# Patient Record
Sex: FEMALE | Race: WHITE | NOT HISPANIC OR LATINO | Employment: OTHER | RURAL
[De-identification: names, ages, dates, MRNs, and addresses within clinical notes are randomized per-mention and may not be internally consistent; named-entity substitution may affect disease eponyms.]

---

## 2017-03-21 ENCOUNTER — OFFICE VISIT (OUTPATIENT)
Dept: CARDIOLOGY | Facility: CLINIC | Age: 57
End: 2017-03-21

## 2017-03-21 VITALS
SYSTOLIC BLOOD PRESSURE: 163 MMHG | WEIGHT: 188.6 LBS | BODY MASS INDEX: 34.71 KG/M2 | HEIGHT: 62 IN | DIASTOLIC BLOOD PRESSURE: 86 MMHG | HEART RATE: 72 BPM

## 2017-03-21 DIAGNOSIS — E78.5 DYSLIPIDEMIA: ICD-10-CM

## 2017-03-21 DIAGNOSIS — I25.10 CORONARY ARTERY DISEASE INVOLVING NATIVE CORONARY ARTERY OF NATIVE HEART WITHOUT ANGINA PECTORIS: Primary | ICD-10-CM

## 2017-03-21 DIAGNOSIS — I10 ESSENTIAL HYPERTENSION: ICD-10-CM

## 2017-03-21 DIAGNOSIS — I25.10 CORONARY ARTERY DISEASE INVOLVING NATIVE CORONARY ARTERY OF NATIVE HEART WITHOUT ANGINA PECTORIS: ICD-10-CM

## 2017-03-21 PROCEDURE — 99213 OFFICE O/P EST LOW 20 MIN: CPT | Performed by: INTERNAL MEDICINE

## 2017-03-21 RX ORDER — LISINOPRIL 10 MG/1
10 TABLET ORAL DAILY
Qty: 30 TABLET | Refills: 6 | Status: SHIPPED | OUTPATIENT
Start: 2017-03-21 | End: 2018-09-25

## 2017-03-21 RX ORDER — ASPIRIN 325 MG
325 TABLET ORAL DAILY
COMMUNITY

## 2017-03-21 RX ORDER — NITROGLYCERIN 0.4 MG/1
0.4 TABLET SUBLINGUAL
COMMUNITY
End: 2017-10-17 | Stop reason: SDUPTHER

## 2017-03-21 RX ORDER — OMEPRAZOLE 20 MG/1
40 CAPSULE, DELAYED RELEASE ORAL DAILY
COMMUNITY
End: 2022-06-15 | Stop reason: HOSPADM

## 2017-03-21 RX ORDER — MELATONIN
1000 DAILY
COMMUNITY

## 2017-03-21 RX ORDER — METOPROLOL TARTRATE 50 MG/1
50 TABLET, FILM COATED ORAL EVERY 12 HOURS SCHEDULED
COMMUNITY

## 2017-03-21 NOTE — PROGRESS NOTES
Encounter Date:03/21/2017      Patient ID: Andreea Lopes is a 56 y.o. female.    Chief Complaint: Coronary Artery Disease; Hypertension; and Hyperlipidemia    PROBLEM LIST:  1.  Chronic recurrent chest pains:   a. Multiple previous negative myocardial perfusion study.   b. Positive myocardial perfusion study, 06/12/2015 revealing anterior and anterolateral  ischemia with an LVEF (74%).   c. Cardiac catheterization, 01/04/2016, Barber Mcdonnell MD, revealing nonobstructive coronary artery disease with a 30% to 40% proximal LAD, 30% to 40% mid-LAD, and a 50% distal LAD stenosis of the small caliber segment  of the vessel.  Circumflex had 10% to 20% proximal plaque.  Normal LVEF (65%).   d. Medical management and risk factor modification.   2.  Hypertension.   3. Dyslipidemia.   4. Diabetes mellitus type 2.   5. Obesity with a BMI of 33.   6. Anxiety.   7. Hypothyroidism, on chronic replacement therapy.   8. GERD.   9. Surgical history:   a. Hysterectomy.       History of Present Illness  Patient presents today for follow-up.  Since last visit she has been feeling fine from a cardiac standpoint. Discontinued Imdur after experiencing headaches.  States that her statin therapy was discontinued after liver enzyme elevations. Monitors blood pressure at home notes it usually runs in the 150's/80's.  Denies chest pain, shortness of breath, leg swelling, dizzy spells, palpitations, or syncope. Remains busy and active.    No Known Allergies      Current Outpatient Prescriptions:   •  aspirin 325 MG tablet, Take 325 mg by mouth Daily., Disp: , Rfl:   •  cholecalciferol (VITAMIN D3) 1000 UNITS tablet, Take 1,000 Units by mouth Daily., Disp: , Rfl:   •  levothyroxine (SYNTHROID, LEVOTHROID) 50 MCG tablet, Take 50 mcg by mouth Daily., Disp: , Rfl:   •  metFORMIN (GLUCOPHAGE) 500 MG tablet, Take 500 mg by mouth 2 (Two) Times a Day With Meals., Disp: , Rfl:   •  metoprolol tartrate (LOPRESSOR) 50 MG tablet, Take 50 mg by mouth  "2 (Two) Times a Day. Patient doesn't take BID everyday, Disp: , Rfl:   •  nitroglycerin (NITROSTAT) 0.4 MG SL tablet, Place 0.4 mg under the tongue Every 5 (Five) Minutes As Needed for Chest Pain. Take no more than 3 doses in 15 minutes., Disp: , Rfl:   •  omeprazole (priLOSEC) 20 MG capsule, Take 20 mg by mouth Daily., Disp: , Rfl:   •  lisinopril (PRINIVIL,ZESTRIL) 10 MG tablet, Take 1 tablet by mouth Daily., Disp: 30 tablet, Rfl: 6    The following portions of the patient's history were reviewed and updated as appropriate: allergies, current medications, past medical history, past social history and problem list.    Review of Systems   Constitution: Negative for chills, fever, weight gain and weight loss.   Cardiovascular: Negative for chest pain, claudication, dyspnea on exertion, leg swelling, orthopnea, palpitations, paroxysmal nocturnal dyspnea and syncope.        No dizziness   Gastrointestinal: Negative for abdominal pain, constipation, diarrhea, nausea and vomiting.   Genitourinary:        No urinary symptoms   Neurological:        No symptoms of stroke.   All other systems reviewed and are negative.    Objective:     Blood pressure 163/86, pulse 72, height 62\" (157.5 cm), weight 188 lb 9.6 oz (85.5 kg).  Repeat blood pressure measurement by, Barber Mcdonnell MD, at 170/80      Physical Exam   Constitutional: She appears well-developed and well-nourished.   HENT:   HEENT exam unremarkable.   Neck: Neck supple. No JVD present.   No carotid bruits.   Cardiovascular: Normal rate, regular rhythm and normal heart sounds.    No murmur heard.  2 plus symmetric pulses.   Pulmonary/Chest: Breath sounds normal. She exhibits no tenderness.   Abdominal:   Abdomen benign.   Musculoskeletal: She exhibits no edema.   Neurological:   Neurological exam unremarkable.   Vitals reviewed.    Procedures       Assessment:      Diagnosis Plan   1. Coronary artery disease involving native coronary artery of native heart without angina " pectoris  Comprehensive Metabolic Panel    Lipid Panel   2. Dyslipidemia  Comprehensive Metabolic Panel    Lipid Panel   3. Essential hypertension  Comprehensive Metabolic Panel    Lipid Panel     Plan:   Check FLP and CMP.  After review of results will consider restarting statin therapy.  Start lisinopril 10 mg once daily for better control of blood pressure.  Continue all other current medications.   FU in 6 MO, sooner as needed.  Thank you for allowing us to participate in the care of your patient.     Barber Mcdonnell MD    Please note that portions of this note may have been completed with a voice recognition program. Efforts were made to edit the dictations, but occasionally words are mistranscribed.

## 2017-03-29 ENCOUNTER — TELEPHONE (OUTPATIENT)
Dept: CARDIOLOGY | Facility: CLINIC | Age: 57
End: 2017-03-29

## 2017-03-29 NOTE — TELEPHONE ENCOUNTER
----- Message -----     From: Barber Mcdonnell MD     Sent: 3/23/2017   7:05 PM       To: Tiffany Lee    Please notify her that her liver function tests are normal.  Blood sugar is elevated at 192 and needs further management with her primary care physician.  Her cholesterol level is quite elevated and she will benefit from restarting cholesterol medications.  Prescribed pravastatin 40 mg daily and order FLP and LFTs to be checked in 2 months.

## 2017-03-29 NOTE — TELEPHONE ENCOUNTER
"Discussed findings with patient.    She is not able to tolerate Pravastatin as it makes her feel \"uncomfortable\".  She was previously on Atorvastatin 40mg daily and tolerated it well, but it did cause a rise in her liver enzymes and her PCP dc'd it.  Please advise.      "

## 2017-03-30 NOTE — TELEPHONE ENCOUNTER
Start her on Lipitor 20 mg a day, check FLP and LFTs in 2 months, she should follow-up with PCP for further management.

## 2017-03-31 DIAGNOSIS — Z79.899 HIGH RISK MEDICATION USE: ICD-10-CM

## 2017-03-31 DIAGNOSIS — E78.00 HYPERCHOLESTEREMIA: Primary | ICD-10-CM

## 2017-03-31 RX ORDER — ATORVASTATIN CALCIUM 20 MG/1
20 TABLET, FILM COATED ORAL DAILY
Qty: 90 TABLET | Refills: 3 | Status: SHIPPED | OUTPATIENT
Start: 2017-03-31 | End: 2017-10-17

## 2017-03-31 NOTE — TELEPHONE ENCOUNTER
Informed patient of medication change, and that I will mail lab orders to her.  Patient verbalized understanding.

## 2017-05-22 DIAGNOSIS — I10 ESSENTIAL HYPERTENSION: ICD-10-CM

## 2017-05-22 DIAGNOSIS — E78.5 DYSLIPIDEMIA: ICD-10-CM

## 2017-05-22 DIAGNOSIS — Z79.899 HIGH RISK MEDICATION USE: ICD-10-CM

## 2017-05-22 DIAGNOSIS — I25.10 CORONARY ARTERY DISEASE INVOLVING NATIVE CORONARY ARTERY OF NATIVE HEART WITHOUT ANGINA PECTORIS: ICD-10-CM

## 2017-05-22 DIAGNOSIS — E78.00 HYPERCHOLESTEREMIA: ICD-10-CM

## 2017-10-17 ENCOUNTER — OFFICE VISIT (OUTPATIENT)
Dept: CARDIOLOGY | Facility: CLINIC | Age: 57
End: 2017-10-17

## 2017-10-17 VITALS
HEART RATE: 72 BPM | SYSTOLIC BLOOD PRESSURE: 129 MMHG | HEIGHT: 62 IN | WEIGHT: 174 LBS | DIASTOLIC BLOOD PRESSURE: 75 MMHG | BODY MASS INDEX: 32.02 KG/M2

## 2017-10-17 DIAGNOSIS — I10 ESSENTIAL HYPERTENSION: ICD-10-CM

## 2017-10-17 DIAGNOSIS — R07.2 PRECORDIAL PAIN: Primary | ICD-10-CM

## 2017-10-17 DIAGNOSIS — E78.5 DYSLIPIDEMIA: ICD-10-CM

## 2017-10-17 PROCEDURE — 99213 OFFICE O/P EST LOW 20 MIN: CPT | Performed by: INTERNAL MEDICINE

## 2017-10-17 RX ORDER — ALPRAZOLAM 0.5 MG/1
0.5 TABLET ORAL 2 TIMES DAILY PRN
COMMUNITY

## 2017-10-17 RX ORDER — IBUPROFEN 600 MG/1
600 TABLET ORAL EVERY 6 HOURS PRN
COMMUNITY

## 2017-10-17 RX ORDER — NITROGLYCERIN 0.4 MG/1
0.4 TABLET SUBLINGUAL
Qty: 100 TABLET | Refills: 3 | Status: SHIPPED | OUTPATIENT
Start: 2017-10-17 | End: 2021-10-26 | Stop reason: SDUPTHER

## 2017-10-17 NOTE — PROGRESS NOTES
Encounter Date:10/17/2017      Patient ID: Andreea Lopes is a 57 y.o. female.    Chief Complaint: Coronary Artery Disease    PROBLEM LIST:  1. Chronic recurrent chest pains:  a. Multiple previous negative MPS.  b. Positive MPS, 6/12/2015 revealed anterior and anterolateral ischemia with LVEF 74%.  c. Cardiac catheterization, 1/4/2016, Barber Mcdonnell M.D., revealing nonobstructive CAD with 30-40% proximal LAD, 30-40% mid LAD, and 50% distal LAD stenosis of small caliber segment of vessel.  Circumflex had 10-20% proximal plaque.  Normal LVEF 65%.  d. Medical management and risk factor modification.  2. Hypertension  3. Dyslipidemia  4. DM 2  5. Obesity with BMI 33  6. Anxiety  7. Hypothyroidism, on chronic respiratory therapy.  8. GERD  9. Surgical history:  a. Hysterectomy.    History of Present Illness  Patient presents today for follow-up with a history of recurrent chest pains and cardiac risk factors. Lately she continues to experience episodes of chest pain. Symptoms are random and not necessarily associated with exertion. States that she takes about 1-3 tablets of Nitro per week.  Discontinued Imdur because it was causing headaches which are not caused when she takes nitroglycerin.  She also discontinued statin therapy on her own explaining that she felt worse. Denies chest pain, shortness of breath, leg swelling, palpitations, and syncope. 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  Maintains a healthy diet and understands she needs to since she discontinued her statin therapy.  After her lipid panel in March we had started her on Lipitor 20 mg a day which she discontinued due to severe myalgias, states that she had similar side effects to simvastatin, afterwards she working her diet and repeat lipid panel was excellent.    No Known Allergies      Current Outpatient Prescriptions:   •  ALPRAZolam (XANAX) 0.5 MG tablet, Take 0.5 mg by mouth 2 (Two)  Times a Day As Needed for Anxiety., Disp: , Rfl:   •  aspirin 325 MG tablet, Take 325 mg by mouth Daily., Disp: , Rfl:   •  cholecalciferol (VITAMIN D3) 1000 UNITS tablet, Take 1,000 Units by mouth Daily., Disp: , Rfl:   •  Dapagliflozin Propanediol (FARXIGA) 10 MG tablet, Take  by mouth Daily., Disp: , Rfl:   •  ibuprofen (ADVIL,MOTRIN) 600 MG tablet, Take 600 mg by mouth Every 6 (Six) Hours As Needed for Mild Pain ., Disp: , Rfl:   •  levothyroxine (SYNTHROID, LEVOTHROID) 50 MCG tablet, Take 50 mcg by mouth Daily., Disp: , Rfl:   •  lisinopril (PRINIVIL,ZESTRIL) 10 MG tablet, Take 1 tablet by mouth Daily., Disp: 30 tablet, Rfl: 6  •  metFORMIN (GLUCOPHAGE) 500 MG tablet, Take 500 mg by mouth 2 (Two) Times a Day With Meals., Disp: , Rfl:   •  metoprolol tartrate (LOPRESSOR) 50 MG tablet, Take 50 mg by mouth Daily. Patient doesn't take BID everyday , Disp: , Rfl:   •  nitroglycerin (NITROSTAT) 0.4 MG SL tablet, Place 0.4 mg under the tongue Every 5 (Five) Minutes As Needed for Chest Pain. Take no more than 3 doses in 15 minutes., Disp: , Rfl:   •  omeprazole (priLOSEC) 20 MG capsule, Take 40 mg by mouth Daily., Disp: , Rfl:     The following portions of the patient's history were reviewed and updated as appropriate: allergies, current medications, past family history, past medical history, past social history, past surgical history and problem list.    Review of Systems   Constitution: Positive for weakness. Negative for chills, fever, weight gain and weight loss.   Cardiovascular: Positive for chest pain. Negative for claudication, dyspnea on exertion, leg swelling, orthopnea, palpitations, paroxysmal nocturnal dyspnea and syncope.        No dizziness   Gastrointestinal: Negative for abdominal pain, constipation, diarrhea, nausea and vomiting.   Genitourinary:        No urinary symptoms   Neurological:        No symptoms of stroke.   All other systems reviewed and are negative.      Objective:     Blood pressure  "129/75, pulse 72, height 62\" (157.5 cm), weight 174 lb (78.9 kg).      Physical Exam  Constitutional: She appears well-developed and well-nourished.   HENT:   HEENT exam unremarkable.   Neck: Neck supple. No JVD present.   No carotid bruits.   Cardiovascular: Normal rate, regular rhythm and normal heart sounds.    No murmur heard.  2 plus symmetric pulses.   Pulmonary/Chest: Breath sounds normal. Does not exhibit tenderness.   Abdominal:   Abdomen benign.   Musculoskeletal: Does not exhibit edema.   Neurological:   Neurological exam unremarkable.   Vitals reviewed.    Lab Review:   Procedures       Assessment:      Diagnosis Plan   1. Precordial pain  Recurrent, stable and spastic angina.  Relieved with nitroglycerin.     2. Essential hypertension  Controlled on beta blocker and ACE inhibitor therapy.     3. Dyslipidemia  Patient refuses to take statin therapy as it does not make her feel well.  Reviewed her FLP at today's visit. Decided to continue to control with diet and exercise.       Plan:   Advised to maintain a healthy diet and engage in exercise for optimal management of dyslipidemia since she refuses to undergo statin therapy.  Follow with PCP for risk factor management.  Stable cardiac status.  Continue current medications.   FU in 12 MO, sooner as needed.  Thank you for allowing us to participate in the care of your patient.     Barber Mcdonnell MD, FACC, [facility]            Please note that portions of this note may have been completed with a voice recognition program. Efforts were made to edit the dictations, but occasionally words are mistranscribed.    "

## 2018-03-20 ENCOUNTER — OFFICE VISIT (OUTPATIENT)
Dept: CARDIOLOGY | Facility: CLINIC | Age: 58
End: 2018-03-20

## 2018-03-20 VITALS
WEIGHT: 181 LBS | HEIGHT: 62 IN | HEART RATE: 66 BPM | DIASTOLIC BLOOD PRESSURE: 80 MMHG | BODY MASS INDEX: 33.31 KG/M2 | SYSTOLIC BLOOD PRESSURE: 159 MMHG

## 2018-03-20 DIAGNOSIS — E78.5 DYSLIPIDEMIA: ICD-10-CM

## 2018-03-20 DIAGNOSIS — I10 ESSENTIAL HYPERTENSION: ICD-10-CM

## 2018-03-20 DIAGNOSIS — R07.2 PRECORDIAL PAIN: Primary | ICD-10-CM

## 2018-03-20 PROCEDURE — 99213 OFFICE O/P EST LOW 20 MIN: CPT | Performed by: INTERNAL MEDICINE

## 2018-03-20 RX ORDER — AMLODIPINE BESYLATE 5 MG/1
5 TABLET ORAL DAILY
Qty: 30 TABLET | Refills: 6 | Status: SHIPPED | OUTPATIENT
Start: 2018-03-20 | End: 2018-09-25

## 2018-03-20 RX ORDER — ESTRADIOL 1 MG/1
1 TABLET ORAL DAILY
COMMUNITY
End: 2018-09-25

## 2018-03-20 NOTE — PROGRESS NOTES
Encounter Date:03/20/2018      Patient ID: Andreea Lopes is a 57 y.o. female.    Chief Complaint: Coronary Artery Disease      PROBLEM LIST:  1. Chronic recurrent chest pains:  a. Multiple previous negative MPS.  b. Positive MPS, 6/12/2015 revealed anterior and anterolateral ischemia with LVEF 74%.  c. Cardiac catheterization, 1/4/2016, Barber Mcdonnell M.D., revealing nonobstructive CAD with 30-40% proximal LAD, 30-40% mid LAD, and 50% distal LAD stenosis of small caliber segment of vessel.  Circumflex had 10-20% proximal plaque.  Normal LVEF 65%.  d. Medical management and risk factor modification.  2. Hypertension  3. Dyslipidemia  4. DM 2  5. Obesity with BMI 33  6. Anxiety  7. Hypothyroidism, on chronic respiratory therapy.  8. GERD  9. Surgical history:  a. Hysterectomy.    History of Present Illness  Patient presents today for follow-up with a history of chest pain and cardiac risk factors. Since last visit has been doing well form a cardiac standpoint. Had a recent admission to the hospital after an episode of chest pain. Was diagnosed with pancreatitis and was given fluids and a nitro patch while admitted. States that although symptoms have improved, mild pain is still present. Discontinued her cholesterol medication due to side affects.  This that she was noted to have sludge on her gallbladder ultrasound.  Has failed to monitor her diet. Denies shortness of breath, leg swelling, palpitations, and syncope. Remains busy and active without significant limitations.     No Known Allergies      Current Outpatient Prescriptions:   •  ALPRAZolam (XANAX) 0.5 MG tablet, Take 0.5 mg by mouth 2 (Two) Times a Day As Needed for Anxiety., Disp: , Rfl:   •  aspirin 325 MG tablet, Take 325 mg by mouth Daily., Disp: , Rfl:   •  cholecalciferol (VITAMIN D3) 1000 UNITS tablet, Take 1,000 Units by mouth Daily., Disp: , Rfl:   •  estradiol (ESTRACE) 1 MG tablet, Take 1 mg by mouth Daily., Disp: , Rfl:   •  ibuprofen  "(ADVIL,MOTRIN) 600 MG tablet, Take 600 mg by mouth Every 6 (Six) Hours As Needed for Mild Pain ., Disp: , Rfl:   •  levothyroxine (SYNTHROID, LEVOTHROID) 50 MCG tablet, Take 50 mcg by mouth Daily., Disp: , Rfl:   •  lisinopril (PRINIVIL,ZESTRIL) 10 MG tablet, Take 1 tablet by mouth Daily., Disp: 30 tablet, Rfl: 6  •  metFORMIN (GLUCOPHAGE) 500 MG tablet, Take 500 mg by mouth 2 (Two) Times a Day With Meals., Disp: , Rfl:   •  metoprolol tartrate (LOPRESSOR) 50 MG tablet, Take 50 mg by mouth Every 12 (Twelve) Hours. Patient doesn't take BID everyday , Disp: , Rfl:   •  nitroglycerin (NITROSTAT) 0.4 MG SL tablet, Place 1 tablet under the tongue Every 5 (Five) Minutes As Needed for Chest Pain. Take no more than 3 doses in 15 minutes., Disp: 100 tablet, Rfl: 3  •  omeprazole (priLOSEC) 20 MG capsule, Take 40 mg by mouth Daily., Disp: , Rfl:   •  amLODIPine (NORVASC) 5 MG tablet, Take 1 tablet by mouth Daily., Disp: 30 tablet, Rfl: 6    The following portions of the patient's history were reviewed and updated as appropriate: allergies, current medications, past family history, past medical history, past social history, past surgical history and problem list.    ROS  Review of Systems   Constitution: Negative for chills, fever, weight gain and weight loss.   Cardiovascular: Negative for chest pain, claudication, dyspnea on exertion, leg swelling, orthopnea, palpitations, paroxysmal nocturnal dyspnea and syncope.        No dizziness   Gastrointestinal: Negative for abdominal pain, constipation, diarrhea, nausea and vomiting.   Genitourinary:        No urinary symptoms   Neurological:        No symptoms of stroke.   All other systems reviewed and are negative.    Objective:     Blood pressure 159/80, pulse 66, height 157.5 cm (62\"), weight 82.1 kg (181 lb).  Patient reports that blood pressure runs normal around 120 at home.    Physical Exam  Constitutional: She appears well-developed and well-nourished.   HENT:   HEENT exam " unremarkable.   Neck: Neck supple. No JVD present.   No carotid bruits.   Cardiovascular: Normal rate, regular rhythm and normal heart sounds.    No murmur heard.  2 plus symmetric pulses.   Pulmonary/Chest: Breath sounds normal. Does not exhibit tenderness.   Abdominal:   Abdomen benign.   Musculoskeletal: Does not exhibit edema.   Neurological:   Neurological exam unremarkable.   Vitals reviewed.    Lab Review:   Procedures       Assessment:      Diagnosis Plan   1. Precordial pain And nonocclusive CAD  Add amlodipine, previous Imdur was not tolerated due to headache.     2. Essential hypertension  Add Amlodipine 5 mg to current medication therapy   3. Dyslipidemia. LDL goal < 70. Does not take statin therapy due to side affects     Plan:   Recommended optimal lipid management with good diet, close follow-up with PCP and trial of other medications as this is paramount to preventing cardiac problems in future.  Add amlodipine 5 mg, continue rest of the current medications.  Suspect a lot of her symptoms may be due to gallbladder disease, I have recommended further evaluation with surgery as recommended by her PCP.  Follow-up and 6 MO, sooner as needed.  Thank you for allowing us to participate in the care of your patient.     Scribed for Barber Mcdonnell MD by Rickie Singh. 3/20/2018  11:26 AM    I, Barber Mcdonnell MD, personally performed the services described in this documentation as scribed by the above named individual in my presence, and it is both accurate and complete.  3/20/2018  11:35 AM        Please note that portions of this note may have been completed with a voice recognition program. Efforts were made to edit the dictations, but occasionally words are mistranscribed.

## 2018-09-25 ENCOUNTER — OFFICE VISIT (OUTPATIENT)
Dept: CARDIOLOGY | Facility: CLINIC | Age: 58
End: 2018-09-25

## 2018-09-25 VITALS
HEART RATE: 70 BPM | SYSTOLIC BLOOD PRESSURE: 140 MMHG | HEIGHT: 62 IN | DIASTOLIC BLOOD PRESSURE: 80 MMHG | BODY MASS INDEX: 32.76 KG/M2 | WEIGHT: 178 LBS | OXYGEN SATURATION: 99 %

## 2018-09-25 DIAGNOSIS — I10 ESSENTIAL HYPERTENSION: ICD-10-CM

## 2018-09-25 DIAGNOSIS — I25.10 CORONARY ARTERY DISEASE INVOLVING NATIVE CORONARY ARTERY OF NATIVE HEART WITHOUT ANGINA PECTORIS: Primary | ICD-10-CM

## 2018-09-25 DIAGNOSIS — I20.8 OTHER FORMS OF ANGINA PECTORIS (HCC): ICD-10-CM

## 2018-09-25 DIAGNOSIS — Z78.9 STATIN INTOLERANCE: ICD-10-CM

## 2018-09-25 DIAGNOSIS — E78.5 DYSLIPIDEMIA: ICD-10-CM

## 2018-09-25 PROCEDURE — 99214 OFFICE O/P EST MOD 30 MIN: CPT | Performed by: INTERNAL MEDICINE

## 2018-09-25 RX ORDER — LISINOPRIL 10 MG/1
10 TABLET ORAL DAILY
Qty: 30 TABLET | Refills: 11 | Status: SHIPPED | OUTPATIENT
Start: 2018-09-25 | End: 2020-01-21

## 2018-09-25 NOTE — PROGRESS NOTES
Encounter Date:09/25/2018      Patient ID: Andreea Lopes is a 58 y.o. female.    Chief Complaint: Coronary Artery Disease      PROBLEM LIST:  1. Chronic recurrent chest pains:  a. Multiple previous negative MPS.  b. Positive MPS, 6/12/2015 revealed anterior and anterolateral ischemia with LVEF 74%.  c. Cardiac catheterization, 1/4/2016, Barber Mcdonnell M.D., revealing nonobstructive CAD with 30-40% proximal LAD, 30-40% mid LAD, and 50% distal LAD stenosis of small caliber segment of vessel.  Circumflex had 10-20% proximal plaque.  Normal LVEF 65%.  d. Medical management and risk factor modification.  2. Hypertension  3. Dyslipidemia  4. DM 2  5. Obesity with BMI 33  6. Anxiety  7. Hypothyroidism, on chronic respiratory therapy.  8. GERD  9. Surgical history:  a. Hysterectomy.    History of Present Illness  Patient presents today for follow-up with a history of chest pain and cardiac risk factors.  She returns today for follow-up for presumed spastic angina with non-occlusive coronary artery disease, hypertension, dyslipidemia and statin intolerance.  She continues to have chest discomfort approximately once per month which requires a single nitroglycerin for relief.  She states her blood pressure has been running about 150s at home and that her lisinopril may have been reduced from 10 mg to 5 inadvertently by primary care.  Her last visit here we added amlodipine 5 mg daily which she did not tolerate.  States she took a single dose became dizzy and had elevated blood pressure.  Her cholesterol was recently checked by primary care and shows an LDL of 141 and an HDL of 57.  Her hemoglobin A1c is at target and thyroid function is within normal limits she states compliance with current medical regimen.  She states that she has tried at least 3 statins and is unable to tolerate them.  She is not interested in taking shots for her cholesterol.    No Known Allergies      Current Outpatient Prescriptions:   •   ALPRAZolam (XANAX) 0.5 MG tablet, Take 0.5 mg by mouth 2 (Two) Times a Day As Needed for Anxiety., Disp: , Rfl:   •  aspirin 325 MG tablet, Take 325 mg by mouth Daily., Disp: , Rfl:   •  cholecalciferol (VITAMIN D3) 1000 UNITS tablet, Take 1,000 Units by mouth Daily., Disp: , Rfl:   •  ibuprofen (ADVIL,MOTRIN) 600 MG tablet, Take 600 mg by mouth Every 6 (Six) Hours As Needed for Mild Pain ., Disp: , Rfl:   •  levothyroxine (SYNTHROID, LEVOTHROID) 50 MCG tablet, Take 50 mcg by mouth Daily., Disp: , Rfl:   •  lisinopril (PRINIVIL,ZESTRIL) 10 MG tablet, Take 1 tablet by mouth Daily. (Patient taking differently: Take 5 mg by mouth Daily.), Disp: 30 tablet, Rfl: 6  •  metFORMIN (GLUCOPHAGE) 500 MG tablet, Take 500 mg by mouth 2 (Two) Times a Day With Meals., Disp: , Rfl:   •  metoprolol tartrate (LOPRESSOR) 50 MG tablet, Take 50 mg by mouth Every 12 (Twelve) Hours. Patient doesn't take BID everyday , Disp: , Rfl:   •  nitroglycerin (NITROSTAT) 0.4 MG SL tablet, Place 1 tablet under the tongue Every 5 (Five) Minutes As Needed for Chest Pain. Take no more than 3 doses in 15 minutes., Disp: 100 tablet, Rfl: 3  •  omeprazole (priLOSEC) 20 MG capsule, Take 40 mg by mouth Daily., Disp: , Rfl:     The following portions of the patient's history were reviewed and updated as appropriate: allergies, current medications, past family history, past medical history, past social history, past surgical history and problem list.    ROS  Review of Systems   Constitution: Negative for chills, fever, weight gain and weight loss.   Cardiovascular: Negative for chest pain, claudication, dyspnea on exertion, leg swelling, orthopnea, palpitations, paroxysmal nocturnal dyspnea and syncope.        No dizziness   Gastrointestinal: Negative for abdominal pain, constipation, diarrhea, nausea and vomiting.   Genitourinary:        No urinary symptoms   Neurological:        No symptoms of stroke.   All other systems reviewed and are  "negative.    Objective:     Blood pressure 140/80, pulse 70, height 157.5 cm (62\"), weight 80.7 kg (178 lb), SpO2 99 %.        Physical Exam  Constitutional: She appears well-developed and well-nourished.   HENT:   HEENT exam unremarkable.   Neck: Neck supple. No JVD present.   No carotid bruits.   Cardiovascular: Normal rate, regular rhythm and normal heart sounds.    No murmur heard.  2 plus symmetric pulses.   Pulmonary/Chest: Breath sounds normal. Does not exhibit tenderness.   Abdominal:   Abdomen benign.   Musculoskeletal: Does not exhibit edema.   Neurological:   Neurological exam unremarkable.   Vitals reviewed.    Lab Review:   Procedures       Assessment:      Diagnosis Plan   1. Coronary artery disease involving native coronary artery of native heart without angina pectoris  Stable    2. Dyslipidemia  Declines PCS K-9 treatment    3. Essential hypertension  Not to goal.  I will change her lisinopril back to 10 mg daily.  I've asked her to keep a daily record for the next one to 2 weeks and call our office for possible further titration.     4. Other forms of angina pectoris (CMS/HCC)  Stable    5. Statin intolerance       Plan:     Continue current medications.   FU in 6 MO, sooner as needed.  Thank you for allowing us to participate in the care of your patient.     ISI Clarke  09/25/2018  10:00 AM    Please note that portions of this note may have been completed with a voice recognition program. Efforts were made to edit the dictations, but occasionally words are mistranscribed.      "

## 2019-01-21 ENCOUNTER — OFFICE VISIT (OUTPATIENT)
Dept: SURGERY | Facility: CLINIC | Age: 59
End: 2019-01-21

## 2019-01-21 VITALS
OXYGEN SATURATION: 99 % | WEIGHT: 178.2 LBS | DIASTOLIC BLOOD PRESSURE: 79 MMHG | HEIGHT: 62 IN | BODY MASS INDEX: 32.79 KG/M2 | HEART RATE: 74 BPM | SYSTOLIC BLOOD PRESSURE: 180 MMHG | TEMPERATURE: 97.7 F

## 2019-01-21 DIAGNOSIS — R10.11 RIGHT UPPER QUADRANT ABDOMINAL PAIN: Primary | ICD-10-CM

## 2019-01-21 PROCEDURE — 99214 OFFICE O/P EST MOD 30 MIN: CPT | Performed by: SURGERY

## 2019-01-21 NOTE — PROGRESS NOTES
Patient: Andreea Lopes    YOB: 1960    Date: 01/21/2019    Primary Care Provider: Shoshana Yee MD    Chief Complaint   Patient presents with   • Abdominal Pain       Subjective .     History of present illness:  Patient with a 1 year history now of having abdominal pain in the right side/upper quadrant.  Associated nausea.  She states that it is a pressure feeling.  Can be moderate to severe in severity.  No definite aggravating factors although working seems to make it worse.  She also has pain in the mid upper back.  No obvious dietary augmentation of the pain    The following portions of the patient's history were reviewed and updated as appropriate: allergies, current medications, past family history, past medical history, past social history, past surgical history and problem list.    Review of Systems   Constitutional: Negative for chills, fever and unexpected weight change.   HENT: Negative for hearing loss, trouble swallowing and voice change.    Eyes: Negative for visual disturbance.   Respiratory: Negative for apnea, cough, chest tightness, shortness of breath and wheezing.    Cardiovascular: Negative for chest pain, palpitations and leg swelling.   Gastrointestinal: Positive for abdominal pain and nausea. Negative for abdominal distention, anal bleeding, blood in stool, constipation, diarrhea, rectal pain and vomiting.   Endocrine: Negative for cold intolerance and heat intolerance.   Genitourinary: Negative for difficulty urinating, dysuria and flank pain.   Musculoskeletal: Negative for back pain and gait problem.   Skin: Negative for color change, rash and wound.   Neurological: Negative for dizziness, syncope, speech difficulty, weakness, light-headedness, numbness and headaches.   Hematological: Negative for adenopathy. Does not bruise/bleed easily.   Psychiatric/Behavioral: Negative for confusion. The patient is not nervous/anxious.        History:  Past Medical History:    Diagnosis Date   • Anxiety    • Chest pain     Chronic recurrent   • Diabetes mellitus (CMS/HCC)     Type 2   • Dyslipidemia     On Statin therapy   • GERD (gastroesophageal reflux disease)    • Hypertension    • Hypothyroidism     on chronic replacement therapy   • Obesity     BMI 33       Past Surgical History:   Procedure Laterality Date   • BREAST BIOPSY     • HYSTERECTOMY     • OOPHORECTOMY         Family History   Problem Relation Age of Onset   • Colon cancer Father 55   • Cancer Father    • Breast cancer Maternal Aunt 48   • Heart disease Mother    • Cancer Sister    • Heart disease Sister    • Heart disease Brother    • Cancer Paternal Uncle    • Heart disease Paternal Grandfather        Social History     Tobacco Use   • Smoking status: Never Smoker   • Smokeless tobacco: Never Used   Substance Use Topics   • Alcohol use: No   • Drug use: No       Allergies:  No Known Allergies    Medications:    Current Outpatient Medications:   •  ALPRAZolam (XANAX) 0.5 MG tablet, Take 0.5 mg by mouth 2 (Two) Times a Day As Needed for Anxiety., Disp: , Rfl:   •  aspirin 325 MG tablet, Take 325 mg by mouth Daily., Disp: , Rfl:   •  cholecalciferol (VITAMIN D3) 1000 UNITS tablet, Take 1,000 Units by mouth Daily., Disp: , Rfl:   •  ibuprofen (ADVIL,MOTRIN) 600 MG tablet, Take 600 mg by mouth Every 6 (Six) Hours As Needed for Mild Pain ., Disp: , Rfl:   •  levothyroxine (SYNTHROID, LEVOTHROID) 50 MCG tablet, Take 50 mcg by mouth Daily., Disp: , Rfl:   •  lisinopril (PRINIVIL,ZESTRIL) 10 MG tablet, Take 1 tablet by mouth Daily., Disp: 30 tablet, Rfl: 11  •  metFORMIN (GLUCOPHAGE) 500 MG tablet, Take 500 mg by mouth 2 (Two) Times a Day With Meals., Disp: , Rfl:   •  metoprolol tartrate (LOPRESSOR) 50 MG tablet, Take 50 mg by mouth Every 12 (Twelve) Hours. Patient doesn't take BID everyday , Disp: , Rfl:   •  nitroglycerin (NITROSTAT) 0.4 MG SL tablet, Place 1 tablet under the tongue Every 5 (Five) Minutes As Needed for Chest  "Pain. Take no more than 3 doses in 15 minutes., Disp: 100 tablet, Rfl: 3  •  omeprazole (priLOSEC) 20 MG capsule, Take 40 mg by mouth Daily., Disp: , Rfl:     Objective     Vital Signs:   Vitals:    01/21/19 1255   BP: 180/79   Pulse: 74   Temp: 97.7 °F (36.5 °C)   SpO2: 99%   Weight: 80.8 kg (178 lb 3.2 oz)   Height: 157.5 cm (62\")       Physical Exam:   General Appearance:    Alert, cooperative, in no acute distress   Head:    Normocephalic, without obvious abnormality, atraumatic   Eyes:            Normal.  No scleral icterus.  PERRLA    Lungs:     Clear to auscultation,respirations regular, even and                  unlabored    Heart:    Regular rhythm and normal rate, normal S1 and S2, no            murmur   Abdomen:     Normal bowel sounds, no masses, no organomegaly, soft        non-tender, non-distended, no guarding,    Extremities:   Moves all extremities well, no edema, no cyanosis, no             redness   Skin:   No bleeding, bruising or rash   Neurologic:   Normal without gross deficits.   Psychiatric: No evidence of depression or anxiety        Results Review:   I reviewed the patient's new clinical results.  Labs and ultrasound were reviewed    Review of Systems was reviewed and confirmed as accurate today.    Assessment/Plan :    1. Right upper quadrant abdominal pain        Unsure as to the etiology of her symptoms.  She had a history of this 2-3 years ago and had an upper and lower endoscopy which were more or less normal.  HIDA scan was 45%.  Sludge in the gallbladder now.  Her symptoms are not classic gallbladder in etiology.  At any rate I will go ahead and repeat her HIDA scan and follow her up after that.    Electronically signed by Ramírez Romero MD  01/21/19    Portions of this note has been scribed for Ramírez Romero MD by Tete Arana. 1/21/2019  1:58 PM        "

## 2019-02-22 ENCOUNTER — TELEPHONE (OUTPATIENT)
Dept: SURGERY | Facility: CLINIC | Age: 59
End: 2019-02-22

## 2019-02-22 NOTE — TELEPHONE ENCOUNTER
left a message on voicemail to let the patient know we are cancelling her appointment since she has not done her Hida scan.

## 2019-02-25 ENCOUNTER — TELEPHONE (OUTPATIENT)
Dept: SURGERY | Facility: CLINIC | Age: 59
End: 2019-02-25

## 2020-01-21 ENCOUNTER — OFFICE VISIT (OUTPATIENT)
Dept: CARDIOLOGY | Facility: CLINIC | Age: 60
End: 2020-01-21

## 2020-01-21 VITALS
WEIGHT: 169 LBS | HEART RATE: 66 BPM | BODY MASS INDEX: 29.95 KG/M2 | DIASTOLIC BLOOD PRESSURE: 74 MMHG | SYSTOLIC BLOOD PRESSURE: 156 MMHG | HEIGHT: 63 IN

## 2020-01-21 DIAGNOSIS — I10 ESSENTIAL HYPERTENSION: ICD-10-CM

## 2020-01-21 DIAGNOSIS — E78.5 DYSLIPIDEMIA: ICD-10-CM

## 2020-01-21 DIAGNOSIS — I25.10 CORONARY ARTERY DISEASE INVOLVING NATIVE CORONARY ARTERY OF NATIVE HEART WITHOUT ANGINA PECTORIS: Primary | ICD-10-CM

## 2020-01-21 DIAGNOSIS — R00.2 PALPITATIONS: ICD-10-CM

## 2020-01-21 PROCEDURE — 99214 OFFICE O/P EST MOD 30 MIN: CPT | Performed by: INTERNAL MEDICINE

## 2020-01-21 RX ORDER — LISINOPRIL 20 MG/1
20 TABLET ORAL DAILY
Qty: 30 TABLET | Refills: 3 | Status: SHIPPED | OUTPATIENT
Start: 2020-01-21 | End: 2021-10-26

## 2020-01-21 RX ORDER — ROSUVASTATIN CALCIUM 5 MG/1
5 TABLET, COATED ORAL 3 TIMES WEEKLY
Status: ON HOLD | COMMUNITY
End: 2022-06-15 | Stop reason: SDUPTHER

## 2020-01-21 NOTE — PROGRESS NOTES
"[facility] Cardiology    Encounter Date:2020        Patient ID: Andreea Lopes is a 59 y.o. female.  : 1960     PCP: Shoshana Yee MD     Chief Complaint: Coronary artery disease involving native coronary artery of       PROBLEM LIST:  1. Nonobstructive CAD with chronic recurrent CP:  a. Multiple previous negative MPS.  b. Positive MPS, 2015: Anterior and anterolateral ischemia. LVEF 74%.  c. [facility], 2016, Barber Mcdonnell M.D., revealing nonobstructive CAD with 30-40% proximal LAD, 30-40% mid LAD, and 50% distal LAD stenosis of small caliber segment of vessel.  Circumflex had 10-20% proximal plaque.  Normal LVEF 65%.  d. Medical management and risk factor modification.  2. Hypertension  3. Dyslipidemia  4. DM 2  5. Obesity with BMI 33  6. Anxiety  7. Hypothyroidism, on chronic respiratory therapy.  8. GERD  9. Surgical history:  a. Hysterectomy.    History of Present Illness  Patient presents today for follow-up with a history of nonobstructive CAD and cardiac risk factors. Since last visit, she returns a scheduled follow-up.  Last month she had recurrence of moderate to severe chest pain, MI was excluded.  She underwent a myocardial perfusion study which showed no reversible ischemia and normal LV salt systolic function.  The pain later resolved and has not reoccurred.  She has frequent awareness of palpitations most commonly at night.  No dizziness or syncope.  Her blood pressure remains elevated and is frequently 160 to 190 mmHg.  She reports having had a recent lipid panel which was \"still high\".  States she is now back on her Crestor 5 mg 3 times per week.  There is no current complaint of exertional chest pain or dyspnea orthopnea no PND no claudication no lower extremity edema.    No Known Allergies      Current Outpatient Medications:   •  ALPRAZolam (XANAX) 0.5 MG tablet, Take 0.5 mg by mouth 2 (Two) Times a Day As Needed for Anxiety., Disp: , Rfl:   •  " aspirin 325 MG tablet, Take 325 mg by mouth Daily., Disp: , Rfl:   •  cholecalciferol (VITAMIN D3) 1000 UNITS tablet, Take 1,000 Units by mouth Daily., Disp: , Rfl:   •  ibuprofen (ADVIL,MOTRIN) 600 MG tablet, Take 600 mg by mouth Every 6 (Six) Hours As Needed for Mild Pain ., Disp: , Rfl:   •  LEVOTHYROXINE SODIUM PO, Take 62 mcg by mouth Daily., Disp: , Rfl:   •  lisinopril (PRINIVIL,ZESTRIL) 10 MG tablet, Take 1 tablet by mouth Daily., Disp: 30 tablet, Rfl: 11  •  metFORMIN (GLUCOPHAGE) 500 MG tablet, Take 500 mg by mouth 2 (Two) Times a Day With Meals., Disp: , Rfl:   •  metoprolol tartrate (LOPRESSOR) 50 MG tablet, Take 50 mg by mouth Every 12 (Twelve) Hours. Patient doesn't take BID everyday , Disp: , Rfl:   •  nitroglycerin (NITROSTAT) 0.4 MG SL tablet, Place 1 tablet under the tongue Every 5 (Five) Minutes As Needed for Chest Pain. Take no more than 3 doses in 15 minutes., Disp: 100 tablet, Rfl: 3  •  omeprazole (priLOSEC) 20 MG capsule, Take 40 mg by mouth Daily., Disp: , Rfl:   •  rosuvastatin (CRESTOR) 5 MG tablet, Take 5 mg by mouth 3 (Three) Times a Week., Disp: , Rfl:     The following portions of the patient's history were reviewed and updated as appropriate: allergies, current medications, past family history, past medical history, past social history, past surgical history and problem list.    ROS  Review of Systems   Constitution: Negative for chills, fatigue, fever, generalized weakness.   Cardiovascular: Negative for chest pain, claudication, dyspnea on exertion, leg swelling, orthopnea, palpitations, paroxysmal nocturnal dyspnea and syncope.   Respiratory: Negative for cough, shortness of breath, and wheezing.  HENT: Negative for ear pain, nosebleeds, and tinnitus.  Gastrointestinal: Negative for abdominal pain, constipation, diarrhea, nausea and vomiting.   Genitourinary: No urinary symptoms.  Musculoskeletal: Negative for muscle cramps.  Neurological: Negative for dizziness, headaches, loss of  "balance, numbness, and symptoms of stroke.  Psychiatric: Normal mental status.     All other systems reviewed and are negative.    Objective:     /74 (BP Location: Left arm, Patient Position: Sitting)   Pulse 66   Ht 158.8 cm (62.5\")   Wt 76.7 kg (169 lb)   LMP  (LMP Unknown)   BMI 30.42 kg/m²          Physical Exam  Constitutional: Patient appears well-developed and well-nourished.   HENT: HEENT exam unremarkable.   Neck: Neck supple. No JVD present. No carotid bruits.    Cardiovascular: Normal rate, regular rhythm and normal heart sounds. No murmur heard.   2+ symmetric pulses.   Pulmonary/Chest: Breath sounds normal. Does not exhibit tenderness.   Abdominal: Abdomen benign.   Musculoskeletal: Does not exhibit edema.   Neurological: Neurological exam unremarkable.   Vitals reviewed.    Lab Review:   Lab Results   Component Value Date    GLUCOSE 122 (H) 01/03/2016    BUN 16 01/03/2016    CREATININE 0.7 01/03/2016    K 4.4 01/03/2016    CO2 27 01/03/2016    CALCIUM 9.0 01/03/2016       Procedures       Assessment:      Diagnosis Plan   1. Coronary artery disease involving native coronary artery of native heart without angina pectoris   symptoms, recent myocardial perfusion study showing no reversible ischemia   2. Essential hypertension   increasing her lisinopril to 20 mg daily and once again asked her to keep a daily record of her blood pressures and call our office in approximately 2 weeks for possible further titration.   3. Dyslipidemia   on Crestor and followed by primary care, she is previously declined PCSK9 inhibitor.   4. Palpitations  Holter Monitor - 24 Hour     Plan:     Stable cardiac status.  Continue current medications.   FU in 2 MO, sooner as needed.  Thank you for allowing us to participate in the care of your patient.         Electronically signed by ISI Clarke, 01/21/20, 1:58 PM.      Please note that portions of this note may have been completed with a voice recognition " program. Efforts were made to edit the dictations, but occasionally words are mistranscribed.

## 2020-03-19 ENCOUNTER — TELEPHONE (OUTPATIENT)
Dept: CARDIOLOGY | Facility: CLINIC | Age: 60
End: 2020-03-19

## 2020-03-19 NOTE — TELEPHONE ENCOUNTER
Pt would like to reschedule their appt due to COVID-19 outbreak. Patient reports they are not having any cardiac issues at this time. I encouraged pt to call our office in the event they become symptomatic. I advised them I will be getting in touch with our , who will notify them of their new appt date and time. Pt agreeable and verbalized understanding.

## 2020-07-07 ENCOUNTER — OFFICE VISIT (OUTPATIENT)
Dept: CARDIOLOGY | Facility: CLINIC | Age: 60
End: 2020-07-07

## 2020-07-07 VITALS
SYSTOLIC BLOOD PRESSURE: 134 MMHG | HEART RATE: 79 BPM | WEIGHT: 171 LBS | DIASTOLIC BLOOD PRESSURE: 82 MMHG | HEIGHT: 62 IN | BODY MASS INDEX: 31.47 KG/M2

## 2020-07-07 DIAGNOSIS — E78.5 DYSLIPIDEMIA: ICD-10-CM

## 2020-07-07 DIAGNOSIS — I25.118 CORONARY ARTERY DISEASE OF NATIVE ARTERY OF NATIVE HEART WITH STABLE ANGINA PECTORIS (HCC): Primary | ICD-10-CM

## 2020-07-07 DIAGNOSIS — I10 ESSENTIAL HYPERTENSION: ICD-10-CM

## 2020-07-07 PROCEDURE — 99214 OFFICE O/P EST MOD 30 MIN: CPT | Performed by: INTERNAL MEDICINE

## 2020-07-07 NOTE — PROGRESS NOTES
[facility] Cardiology    Encounter Date: 2020    Patient ID: Andreea Lopes is a 60 y.o. female.  : 1960     PCP: Shoshana Yee MD       Chief Complaint: Coronary artery disease involving native coronary artery       PROBLEM LIST:  1. Nonobstructive CAD with chronic recurrent CP:  a. Multiple previous negative MPS.  b. Positive MPS, 2015: Anterior and anterolateral ischemia. LVEF 74%.  c. [facility], 2016, Dr. Mcdonnell: Nonobstructive CAD with 30-40% proximal LAD, 30-40% mid LAD, and 50% distal LAD stenosis of small caliber segment of vessel.  Circumflex had 10-20% proximal plaque. LVEF 65%.  d. Medical management and risk factor modification.  2. PACs/PVCs:  a. 24h Holter, 2020: SR with 1% PACs and 0.8% PVCs. One short SVT (4 beats)  3. Hypertension  4. Dyslipidemia  5. DM 2  6. Obesity with BMI 33  7. Anxiety  8. Hypothyroidism, on chronic respiratory therapy.  9. GERD  10. Surgical history:  a. Hysterectomy.    History of Present Illness  Patient presents today for a follow-up with a history of coronary artery disease, palpitations, and cardiac risk factors. Since last visit, she has been feeling well overall from a cardiovascular standpoint. She does report some rare episodes of chest pain, which are well-managed by the use of nitroglycerin sublingual as needed. Patient denies shortness of breath, palpitations, dizziness, and syncope. She stays busy and active with her work as a , which keeps her on her feet and walking throughout the day. She reports some mild left leg swelling.    No Known Allergies      Current Outpatient Medications:   •  ALPRAZolam (XANAX) 0.5 MG tablet, Take 0.5 mg by mouth 2 (Two) Times a Day As Needed for Anxiety., Disp: , Rfl:   •  aspirin 325 MG tablet, Take 325 mg by mouth Daily., Disp: , Rfl:   •  cholecalciferol (VITAMIN D3) 1000 UNITS tablet, Take 1,000 Units by mouth Daily., Disp: , Rfl:   •  ibuprofen (ADVIL,MOTRIN) 600  MG tablet, Take 600 mg by mouth Every 6 (Six) Hours As Needed for Mild Pain ., Disp: , Rfl:   •  LEVOTHYROXINE SODIUM PO, Take 62 mcg by mouth Daily., Disp: , Rfl:   •  lisinopril (PRINIVIL,ZESTRIL) 20 MG tablet, Take 1 tablet by mouth Daily., Disp: 30 tablet, Rfl: 3  •  metFORMIN (GLUCOPHAGE) 500 MG tablet, Take 500 mg by mouth 2 (Two) Times a Day With Meals., Disp: , Rfl:   •  metoprolol tartrate (LOPRESSOR) 50 MG tablet, Take 50 mg by mouth Every 12 (Twelve) Hours. Patient doesn't take BID everyday , Disp: , Rfl:   •  nitroglycerin (NITROSTAT) 0.4 MG SL tablet, Place 1 tablet under the tongue Every 5 (Five) Minutes As Needed for Chest Pain. Take no more than 3 doses in 15 minutes., Disp: 100 tablet, Rfl: 3  •  omeprazole (priLOSEC) 20 MG capsule, Take 40 mg by mouth Daily., Disp: , Rfl:   •  rosuvastatin (CRESTOR) 5 MG tablet, Take 5 mg by mouth 3 (Three) Times a Week., Disp: , Rfl:     The following portions of the patient's history were reviewed and updated as appropriate: allergies, current medications, past family history, past medical history, past social history, past surgical history and problem list.    ROS  Review of Systems   Constitution: Negative for chills, fever, fatigue, generalized weakness.   Cardiovascular: Negative for dyspnea on exertion, orthopnea, and syncope. Positive for chest pain and leg swelling  Respiratory: Negative for cough, shortness of breath, and wheezing.  HENT: Negative for ear pain, nosebleeds, and tinnitus.  Gastrointestinal: Negative for abdominal pain, constipation, diarrhea, nausea and vomiting.   Genitourinary: No urinary symptoms.  Musculoskeletal: Negative for muscle cramps.  Neurological: Negative for dizziness, headaches, loss of balance, numbness, and symptoms of stroke.  Psychiatric: Normal mental status.     All other systems reviewed and are negative.        Objective:     /82 (BP Location: Left arm, Patient Position: Sitting)   Pulse 79   Ht 157.5 cm  "(62\")   Wt 77.6 kg (171 lb)   LMP  (LMP Unknown)   BMI 31.28 kg/m²      Physical Exam  Constitutional: Patient appears well-developed and well-nourished.   HENT: HEENT exam unremarkable.   Neck: Neck supple. No JVD present. No carotid bruits.   Cardiovascular: Normal rate, regular rhythm and normal heart sounds. No murmur heard.   2+ symmetric pulses.   Pulmonary/Chest: Breath sounds normal. Does not exhibit tenderness.   Abdominal: Abdomen benign.   Musculoskeletal: Trace LLE edema.   Neurological: Neurological exam unremarkable.   Vitals reviewed.    Lab Review:      12/05/19      HDL 56      Procedures       Assessment:      Diagnosis Plan   1. Coronary artery disease of native artery of native heart with stable angina pectoris (CMS/HCC)  Stable. Continue ASA.  Continue NTG as needed for chest pain. Patient declined Imdur.    2. Essential hypertension  Well-controlled, continue current medications.   3. Dyslipidemia  Monitored by PCP. Continue rosuvastatin 5 mg daily.     Plan:   Patient declined Imdur.  Patient not bothered by her edema and is not interested in vein ablation.  Continue current medications.   FU in 12 MO, sooner as needed.  Thank you for allowing us to participate in the care of your patient.     Scribed for Barber Mcdonnell MD by Alice Riley. 7/7/2020  11:34     I, Barber Mcdonnell MD, personally performed the services described in this documentation as scribed by the above named individual in my presence, and it is both accurate and complete.  7/7/2020  11:38        Please note that portions of this note may have been completed with a voice recognition program. Efforts were made to edit the dictations, but occasionally words are mistranscribed.      "

## 2021-07-19 NOTE — PROGRESS NOTES
[facility] Cardiology    Encounter Date: 2021    Patient ID: Andreea Lopes is a 61 y.o. female.  : 1960     PCP: Shoshana Yee MD       Chief Complaint: Coronary Artery Disease      PROBLEM LIST:  1. Nonobstructive CAD with chronic recurrent CP:  a. Multiple previous negative MPS.  b. Positive MPS, 2015: Anterior and anterolateral ischemia. LVEF 74%.  c. [facility], 2016, Dr. Mcdonnell: Nonobstructive CAD with 30-40% proximal LAD, 30-40% mid LAD, and 50% distal LAD stenosis of small caliber segment of vessel.  Circumflex had 10-20% proximal plaque. LVEF 65%.  d. Medical management and risk factor modification.  2. PACs/PVCs:  a. 24h Holter, 2020: SR with 1% PACs and 0.8% PVCs. One short SVT (4 beats)  3. Hypertension  4. Dyslipidemia  5. DM 2  6. Obesity with BMI 33  7. Anxiety  8. Hypothyroidism, on chronic respiratory therapy.  9. GERD  10. Surgical history:  a. Hysterectomy.    History of Present Illness  Patient presents today for an annual follow-up with a history of coronary artery disease and cardiac risk factors. Since last visit, she's experienced frequent but random dull or burning chest pain that sometimes radiates to her back. She reports this chest pain sometimes wakes her up during the night. To maintain her blood pressure she's been taking 10 mg of lisinopril in the morning and 40 mg at night but she ran out of 10 mg tablets since this was a previous prescription. She is unable to take Imdur since it causes her to have headaches. Patient denies shortness of breath, palpitations, edema, dizziness, and syncope.     No Known Allergies      Current Outpatient Medications:   •  ALPRAZolam (XANAX) 0.5 MG tablet, Take 0.5 mg by mouth 2 (Two) Times a Day As Needed for Anxiety., Disp: , Rfl:   •  aspirin 325 MG tablet, Take 325 mg by mouth Daily., Disp: , Rfl:   •  cholecalciferol (VITAMIN D3) 1000 UNITS tablet, Take 1,000 Units by mouth Daily., Disp: , Rfl:    •  ibuprofen (ADVIL,MOTRIN) 600 MG tablet, Take 600 mg by mouth Every 6 (Six) Hours As Needed for Mild Pain ., Disp: , Rfl:   •  LEVOTHYROXINE SODIUM PO, Take 62 mcg by mouth Daily., Disp: , Rfl:   •  lisinopril (PRINIVIL,ZESTRIL) 20 MG tablet, Take 1 tablet by mouth Daily. (Patient taking differently: Take 40 mg by mouth Daily.), Disp: 30 tablet, Rfl: 3  •  metFORMIN (GLUCOPHAGE) 500 MG tablet, Take 500 mg by mouth 2 (Two) Times a Day With Meals., Disp: , Rfl:   •  metoprolol tartrate (LOPRESSOR) 50 MG tablet, Take 50 mg by mouth Every 12 (Twelve) Hours. Patient doesn't take BID everyday , Disp: , Rfl:   •  nitroglycerin (NITROSTAT) 0.4 MG SL tablet, Place 1 tablet under the tongue Every 5 (Five) Minutes As Needed for Chest Pain. Take no more than 3 doses in 15 minutes., Disp: 100 tablet, Rfl: 3  •  omeprazole (priLOSEC) 20 MG capsule, Take 40 mg by mouth Daily., Disp: , Rfl:   •  rosuvastatin (CRESTOR) 5 MG tablet, Take 5 mg by mouth 3 (Three) Times a Week., Disp: , Rfl:     The following portions of the patient's history were reviewed and updated as appropriate: allergies, current medications, past family history, past medical history, past social history, past surgical history and problem list.    ROS  Review of Systems   Constitution: Negative for chills, fever, fatigue, generalized weakness.   Cardiovascular: Negative for dyspnea on exertion, leg swelling, palpitations, orthopnea, and syncope. Positive for chest pain   Respiratory: Negative for cough, shortness of breath, and wheezing.  HENT: Negative for ear pain, nosebleeds, and tinnitus.  Gastrointestinal: Negative for abdominal pain, constipation, diarrhea, nausea and vomiting.   Genitourinary: No urinary symptoms.  Musculoskeletal: Negative for muscle cramps.  Neurological: Negative for dizziness, headaches, loss of balance, numbness, and symptoms of stroke.  Psychiatric: Normal mental status.     All other systems reviewed and are negative.       "  Objective:     /76 (BP Location: Left arm, Patient Position: Sitting)   Pulse 82   Ht 157.5 cm (62\")   Wt 78.9 kg (174 lb)   LMP  (LMP Unknown)   SpO2 99%   BMI 31.83 kg/m²    BP repeat: 138/84    Physical Exam  Constitutional: Patient appears well-developed and well-nourished.   HENT: HEENT exam unremarkable.   Neck: Neck supple. No JVD present. No carotid bruits.   Cardiovascular: Normal rate, regular rhythm and normal heart sounds. No murmur heard.   2+ symmetric pulses.   Pulmonary/Chest: Breath sounds normal. Does not exhibit tenderness.   Abdominal: Abdomen benign.   Musculoskeletal: Does not exhibit edema.   Neurological: Neurological exam unremarkable.   Vitals reviewed.    Data Review:     12/05/19      HDL 56              ECG 12 Lead    Date/Time: 7/20/2021 10:18 AM  Performed by: Barber Mcdonnell MD  Authorized by: Barber Mcdonnell MD   Comparison: compared with previous ECG from 8/26/2017  Similar to previous ECG  Rhythm: sinus rhythm and sinus arrhythmia  BPM: 73    Clinical impression: normal ECG               Assessment:      Diagnosis Plan   1. Coronary artery disease involving native coronary artery of native heart with other form of angina pectoris (CMS/[facility])  Reports of frequently recurring anginal symptoms, has not been able to tolerate Imdur.  At this time we will start her on Ranexa 500 mg BID and amlodipine 5 mg daily as additional antianginals.. Continue aspirin 325 mg for antiplatelet therapy, metoprolol and nitroglycerin as needed for chest pain    2. Essential hypertension  Begin amlodipine 5 mg daily for improved control of blood pressure which may be contributing to angina.   3. Dyslipidemia  No new data to review; continue rosuvastatin      Plan:   Patient has experienced recurrent anginal symptoms with mixed features, her blood pressure is also elevated, at this time we will optimize antianginal therapy with addition of amlodipine Ranexa.    Continue all other " current medications.   FU in 3 MO, sooner as needed.  In case of ongoing/worsening symptoms he is advised to contact us sooner as we may need to consider further evaluation with coronary angiography.   Thank you for allowing us to participate in the care of your patient.     Scribed for Barber Mcdonnell MD by Johanna Haines. 7/20/2021 10:18 EDT    I, Barber Mcdonnell MD, personally performed the services described in this documentation as scribed by the above named individual in my presence, and it is both accurate and complete.  7/21/2021  06:50 EDT        Please note that portions of this note may have been completed with a voice recognition program. Efforts were made to edit the dictations, but occasionally words are mistranscribed.

## 2021-07-20 ENCOUNTER — OFFICE VISIT (OUTPATIENT)
Dept: CARDIOLOGY | Facility: CLINIC | Age: 61
End: 2021-07-20

## 2021-07-20 VITALS
SYSTOLIC BLOOD PRESSURE: 142 MMHG | HEIGHT: 62 IN | WEIGHT: 174 LBS | DIASTOLIC BLOOD PRESSURE: 76 MMHG | HEART RATE: 82 BPM | BODY MASS INDEX: 32.02 KG/M2 | OXYGEN SATURATION: 99 %

## 2021-07-20 DIAGNOSIS — I25.118 CORONARY ARTERY DISEASE INVOLVING NATIVE CORONARY ARTERY OF NATIVE HEART WITH OTHER FORM OF ANGINA PECTORIS (HCC): Primary | ICD-10-CM

## 2021-07-20 DIAGNOSIS — E78.5 DYSLIPIDEMIA: ICD-10-CM

## 2021-07-20 DIAGNOSIS — I10 ESSENTIAL HYPERTENSION: ICD-10-CM

## 2021-07-20 PROCEDURE — 93000 ELECTROCARDIOGRAM COMPLETE: CPT | Performed by: INTERNAL MEDICINE

## 2021-07-20 PROCEDURE — 99214 OFFICE O/P EST MOD 30 MIN: CPT | Performed by: INTERNAL MEDICINE

## 2021-07-20 RX ORDER — AMLODIPINE BESYLATE 5 MG/1
5 TABLET ORAL DAILY
Qty: 90 TABLET | Refills: 3 | Status: SHIPPED | OUTPATIENT
Start: 2021-07-20 | End: 2021-10-26 | Stop reason: SDUPTHER

## 2021-07-20 RX ORDER — RANOLAZINE 500 MG/1
500 TABLET, EXTENDED RELEASE ORAL 2 TIMES DAILY
Qty: 180 TABLET | Refills: 1 | Status: SHIPPED | OUTPATIENT
Start: 2021-07-20 | End: 2021-10-26 | Stop reason: SDUPTHER

## 2021-10-25 NOTE — PROGRESS NOTES
[facility] Cardiology    Encounter Date: 10/26/2021    Patient ID: Andreea Lopes is a 61 y.o. female.  : 1960     PCP: Shoshana Yee MD       Chief Complaint: Coronary artery disease involving native coronary artery of       PROBLEM LIST:  1. Nonobstructive CAD with chronic recurrent CP:  a. Multiple previous negative MPS.  b. Positive MPS, 2015: Anterior and anterolateral ischemia. LVEF 74%.  c. [facility], 2016, Dr. Mcdonnell: Nonobstructive CAD with 30-40% proximal LAD, 30-40% mid LAD, and 50% distal LAD stenosis of small caliber segment of vessel.  Circumflex had 10-20% proximal plaque. LVEF 65%.  d. Medical management and risk factor modification.  2. PACs/PVCs:  a. 24h Holter, 2020: SR with 1% PACs and 0.8% PVCs. One short SVT (4 beats)  3. Hypertension  4. Dyslipidemia  5. DM 2  6. Obesity with BMI 33  7. Anxiety  8. Hypothyroidism, on chronic respiratory therapy.  9. GERD  10. Surgical history:  a. Hysterectomy.    History of Present Illness  Patient presents today for a 3 month follow-up with a history of coronary artery disease and cardiac risk factors. Since last visit, the patient has been doing well overall from a cardiovascular standpoint. She's prescribed rosuvastatin three times a week but doesn't take it since it makes her feel unwell. She doesn't want to try PCSK9 injections and has agreed to make an effort to take rosuvastatin. Patient denies chest pain, shortness of breath, palpitations, edema, dizziness, and syncope.     No Known Allergies      Current Outpatient Medications:   •  ALPRAZolam (XANAX) 0.5 MG tablet, Take 0.5 mg by mouth 2 (Two) Times a Day As Needed for Anxiety., Disp: , Rfl:   •  amLODIPine (NORVASC) 5 MG tablet, Take 1 tablet by mouth Daily., Disp: 90 tablet, Rfl: 3  •  aspirin 325 MG tablet, Take 325 mg by mouth Daily., Disp: , Rfl:   •  cholecalciferol (VITAMIN D3) 1000 UNITS tablet, Take 1,000 Units by mouth Daily., Disp: , Rfl:    •  ibuprofen (ADVIL,MOTRIN) 600 MG tablet, Take 600 mg by mouth Every 6 (Six) Hours As Needed for Mild Pain ., Disp: , Rfl:   •  LEVOTHYROXINE SODIUM PO, Take 62 mcg by mouth Daily., Disp: , Rfl:   •  lisinopril (PRINIVIL,ZESTRIL) 40 MG tablet, Take 40 mg by mouth., Disp: , Rfl:   •  metFORMIN (GLUCOPHAGE) 500 MG tablet, Take 500 mg by mouth 2 (Two) Times a Day With Meals., Disp: , Rfl:   •  metoprolol tartrate (LOPRESSOR) 50 MG tablet, Take 50 mg by mouth Every 12 (Twelve) Hours. Patient doesn't take BID everyday , Disp: , Rfl:   •  nitroglycerin (NITROSTAT) 0.4 MG SL tablet, Place 1 tablet under the tongue Every 5 (Five) Minutes As Needed for Chest Pain. Take no more than 3 doses in 15 minutes., Disp: 25 tablet, Rfl: 3  •  omeprazole (priLOSEC) 20 MG capsule, Take 40 mg by mouth Daily., Disp: , Rfl:   •  ranolazine (Ranexa) 500 MG 12 hr tablet, Take 1 tablet by mouth 2 (Two) Times a Day., Disp: 180 tablet, Rfl: 3  •  rosuvastatin (CRESTOR) 5 MG tablet, Take 5 mg by mouth 3 (Three) Times a Week., Disp: , Rfl:     The following portions of the patient's history were reviewed and updated as appropriate: allergies, current medications, past family history, past medical history, past social history, past surgical history and problem list.    ROS  Review of Systems   Constitution: Negative for chills, fever, fatigue, generalized weakness.   Cardiovascular: Negative for chest pain, dyspnea on exertion, leg swelling, palpitations, orthopnea, and syncope.   Respiratory: Negative for cough, shortness of breath, and wheezing.  HENT: Negative for ear pain, nosebleeds, and tinnitus.  Gastrointestinal: Negative for abdominal pain, constipation, diarrhea, nausea and vomiting.   Genitourinary: No urinary symptoms.  Musculoskeletal: Negative for muscle cramps.  Neurological: Negative for dizziness, headaches, loss of balance, numbness, and symptoms of stroke.  Psychiatric: Normal mental status.     All other systems reviewed and  "are negative.        Objective:     /74 (BP Location: Right arm, Patient Position: Sitting)   Pulse 88   Ht 158.8 cm (62.5\")   Wt 78.9 kg (174 lb)   LMP  (LMP Unknown)   SpO2 98%   BMI 31.32 kg/m²      Physical Exam  Constitutional: Patient appears well-developed and well-nourished.   HENT: HEENT exam unremarkable.   Neck: Neck supple. No JVD present. No carotid bruits.   Cardiovascular: Normal rate, regular rhythm and normal heart sounds. No murmur heard.   2+ symmetric pulses.   Pulmonary/Chest: Breath sounds normal. Does not exhibit tenderness.   Abdominal: Abdomen benign.   Musculoskeletal: Does not exhibit edema.   Neurological: Neurological exam unremarkable.   Vitals reviewed.    Data Review:     Lab date: 7/30/2021  • FLP: , , HDL 51,   • CMP: Glu 136, BUN 14, Creat 0.76, eGFR 77, Na 140, K 4.9, Cl 104, CO2 25, Ca 9.2, Alk Phos 104, AST 26, ALT 28  • CBC: WBC 6.5, RBC 4.52, HGB 11.9, HCT 36.7, MCV 81.2, MCH 26.2,   • HbA1c: 6.95    12/05/19      HDL 56       Procedures       Assessment:      Diagnosis Plan   1. Coronary artery disease involving native coronary artery of native heart without angina pectoris  Stable without angina; continue on aspirin 325 mg for antiplatelet therapy, Ranexa 500 mg BID, and nitroglycerin as needed   2. Essential hypertension  Well controlled; continue amlodipine 5 mg,lisinopril 40 mg, and metoprolol 50 mg    3. Dyslipidemia  Unacceptable control and not currently compliant with statin therapy; patient will resume rosuvastatin 5 mg three times a week and is not interested in starting PCSK9 inhibitor     Plan:   Resume rosuvastatin 5 mg three times a week.  We discussed the option of PCSK9 inhibitors however she is not interested and states that she would rather try low-dose Crestor a few times and plans on improving diet and exercise.  She was advised to follow-up with PCP for reassessment and further management.  The risk " of progression of CAD and cardiac events with uncontrolled dyslipidemia was discussed.  Continue current medications.   FU in 6 MO, sooner as needed.  Thank you for allowing us to participate in the care of your patient.     Scribed for Barber Mcdonnell MD by Johanna Haines. 10/27/2021 10:59 EDT        I, Barber Mcdonnell MD, personally performed the services described in this documentation as scribed by the above named individual in my presence, and it is both accurate and complete.  10/27/2021  06:43 EDT        Please note that portions of this note may have been completed with a voice recognition program. Efforts were made to edit the dictations, but occasionally words are mistranscribed.

## 2021-10-26 ENCOUNTER — OFFICE VISIT (OUTPATIENT)
Dept: CARDIOLOGY | Facility: CLINIC | Age: 61
End: 2021-10-26

## 2021-10-26 VITALS
HEART RATE: 88 BPM | OXYGEN SATURATION: 98 % | DIASTOLIC BLOOD PRESSURE: 74 MMHG | SYSTOLIC BLOOD PRESSURE: 124 MMHG | WEIGHT: 174 LBS | BODY MASS INDEX: 30.83 KG/M2 | HEIGHT: 63 IN

## 2021-10-26 DIAGNOSIS — I25.10 CORONARY ARTERY DISEASE INVOLVING NATIVE CORONARY ARTERY OF NATIVE HEART WITHOUT ANGINA PECTORIS: Primary | ICD-10-CM

## 2021-10-26 DIAGNOSIS — I10 ESSENTIAL HYPERTENSION: ICD-10-CM

## 2021-10-26 DIAGNOSIS — E78.5 DYSLIPIDEMIA: ICD-10-CM

## 2021-10-26 PROCEDURE — 99214 OFFICE O/P EST MOD 30 MIN: CPT | Performed by: INTERNAL MEDICINE

## 2021-10-26 RX ORDER — AMLODIPINE BESYLATE 5 MG/1
5 TABLET ORAL DAILY
Qty: 90 TABLET | Refills: 3 | Status: SHIPPED | OUTPATIENT
Start: 2021-10-26

## 2021-10-26 RX ORDER — RANOLAZINE 500 MG/1
500 TABLET, EXTENDED RELEASE ORAL 2 TIMES DAILY
Qty: 180 TABLET | Refills: 3 | Status: SHIPPED | OUTPATIENT
Start: 2021-10-26 | End: 2023-01-23

## 2021-10-26 RX ORDER — NITROGLYCERIN 0.4 MG/1
0.4 TABLET SUBLINGUAL
Qty: 25 TABLET | Refills: 3 | Status: SHIPPED | OUTPATIENT
Start: 2021-10-26 | End: 2022-05-31 | Stop reason: SDUPTHER

## 2021-10-26 RX ORDER — LISINOPRIL 40 MG/1
40 TABLET ORAL
COMMUNITY
Start: 2021-09-28

## 2021-11-29 ENCOUNTER — TELEPHONE (OUTPATIENT)
Dept: SURGERY | Facility: CLINIC | Age: 61
End: 2021-11-29

## 2021-11-29 NOTE — TELEPHONE ENCOUNTER
PRESCREENING FOR OPEN ACCESS SCHEDULING    Andreea Lopes, 1960  [id number]    11/29/21    If, the patient answers yes to any of the following questions the provider will be informed prior to scheduling open access for approval and documented in the chart.    [x]  Yes  [] No    1. Have you ever had a colonoscopy in the past?      When:  5 years ago       Where:  [facility]      Polyps or other: yes     [x]  Yes  [] No    2. Family history of colon cancer?      Relation:       Age of onset:       Do you currently have any of the following?    []  Yes  [x] No  Rectal bleeding, if so, how long?     []  Yes  [x] No  Abdominal pain, if so, how long?    []  Yes  [x] No  Constipation, if so, how long?    []  Yes  [x] No  Diarrhea, if so, how long?    []  Yes  [x] No  Weight loss, is so, how much?    [] Yes  [x] No  Small caliber stool, if so, how long?      Have you ever had any of the following conditions?    [] Yes  [x] No  Heart attack?      When?       Last cardiac workup?     Blood thinners?    [] Yes  [x] No   Lung problems, asthma or COPD?  [] Yes  [x] No  Oxygen required?       [] Yes  [x] No  Stroke?     [] Yes  [x] No  Have you ever had a reaction to anesthesia?

## 2021-11-29 NOTE — TELEPHONE ENCOUNTER
Patient was offered sooner appointment due to holidays and cold weather patient requested to be scheduled in April.

## 2022-03-23 ENCOUNTER — PREP FOR SURGERY (OUTPATIENT)
Dept: OTHER | Facility: HOSPITAL | Age: 62
End: 2022-03-23

## 2022-03-23 DIAGNOSIS — Z86.010 HISTORY OF COLON POLYPS: Primary | ICD-10-CM

## 2022-03-25 RX ORDER — BISACODYL 5 MG
TABLET, DELAYED RELEASE (ENTERIC COATED) ORAL
Qty: 4 TABLET | Refills: 0 | Status: ON HOLD | OUTPATIENT
Start: 2022-03-25 | End: 2022-06-15

## 2022-03-25 RX ORDER — POLYETHYLENE GLYCOL 3350 17 G/17G
238 POWDER, FOR SOLUTION ORAL ONCE
Qty: 17 PACKET | Refills: 0 | Status: SHIPPED | OUTPATIENT
Start: 2022-03-25 | End: 2022-03-25

## 2022-03-28 DIAGNOSIS — Z01.818 PREOPERATIVE CLEARANCE: Primary | ICD-10-CM

## 2022-03-30 ENCOUNTER — TELEPHONE (OUTPATIENT)
Dept: SURGERY | Facility: CLINIC | Age: 62
End: 2022-03-30

## 2022-05-30 NOTE — PROGRESS NOTES
[facility] Cardiology    Encounter Date: 2022    Patient ID: Andreea Lopes is a 61 y.o. female.  : 1960     PCP: Shoshana Yee MD       Chief Complaint: Coronary artery disease involving native coronary artery of      PROBLEM LIST:  1. Nonobstructive CAD with chronic recurrent CP:  a. Multiple previous negative MPS.  b. Positive MPS, 2015: Anterior and anterolateral ischemia. LVEF 74%.  c. [facility], 2016, Dr. Mcdonnell: Nonobstructive CAD with 30-40% proximal LAD, 30-40% mid LAD, and 50% distal LAD stenosis of small caliber segment of vessel.  Circumflex had 10-20% proximal plaque. LVEF 65%.  d. Medical management and risk factor modification.  2. PACs/PVCs:  a. 24h Holter, 2020: SR with 1% PACs and 0.8% PVCs. One short SVT (4 beats)  3. Hypertension  4. Dyslipidemia  5. DM 2  6. Obesity with BMI 33  7. Anxiety  8. Hypothyroidism, on chronic respiratory therapy.  9. GERD  10. Surgical history:  a. Hysterectomy.    History of Present Illness  Patient presents today for a follow-up with a history of coronary artery disease and cardiac risk factors. Since last visit, patient has had persistent/worsening chest pain that radiates to her back/dyspnea on exertion x 3 months. She struggles to climb the 7 steps that lead into her house without stopping. She has been taking nitroglycerin relatively frequently with relief. She denies palpitations, orthopnea, edema, syncope.       No Known Allergies      Current Outpatient Medications:   •  ALPRAZolam (XANAX) 0.5 MG tablet, Take 0.5 mg by mouth 2 (Two) Times a Day As Needed for Anxiety., Disp: , Rfl:   •  amLODIPine (NORVASC) 5 MG tablet, Take 1 tablet by mouth Daily., Disp: 90 tablet, Rfl: 3  •  aspirin 325 MG tablet, Take 325 mg by mouth Daily., Disp: , Rfl:   •  bisacodyl (Dulcolax) 5 MG EC tablet, Take 2 tablets at 3 pm & 2 tablets at 7 pm day prior to colonoscopy, Disp: 4 tablet, Rfl: 0  •  cholecalciferol (VITAMIN D3)  1000 UNITS tablet, Take 1,000 Units by mouth Daily., Disp: , Rfl:   •  ibuprofen (ADVIL,MOTRIN) 600 MG tablet, Take 600 mg by mouth Every 6 (Six) Hours As Needed for Mild Pain ., Disp: , Rfl:   •  LEVOTHYROXINE SODIUM PO, Take 62 mcg by mouth Daily., Disp: , Rfl:   •  lisinopril (PRINIVIL,ZESTRIL) 40 MG tablet, Take 40 mg by mouth., Disp: , Rfl:   •  metFORMIN (GLUCOPHAGE) 500 MG tablet, Take 500 mg by mouth 2 (Two) Times a Day With Meals., Disp: , Rfl:   •  metoprolol tartrate (LOPRESSOR) 50 MG tablet, Take 50 mg by mouth Every 12 (Twelve) Hours. Patient doesn't take BID everyday, Disp: , Rfl:   •  nitroglycerin (NITROSTAT) 0.4 MG SL tablet, Place 1 tablet under the tongue Every 5 (Five) Minutes As Needed for Chest Pain. Take no more than 3 doses in 15 minutes., Disp: 25 tablet, Rfl: 3  •  omeprazole (priLOSEC) 20 MG capsule, Take 40 mg by mouth Daily., Disp: , Rfl:   •  ranolazine (Ranexa) 500 MG 12 hr tablet, Take 1 tablet by mouth 2 (Two) Times a Day., Disp: 180 tablet, Rfl: 3  •  rosuvastatin (CRESTOR) 5 MG tablet, Take 5 mg by mouth 3 (Three) Times a Week., Disp: , Rfl:     The following portions of the patient's history were reviewed and updated as appropriate: allergies, current medications, past family history, past medical history, past social history, past surgical history and problem list.    ROS  Review of Systems   Constitution: Negative for chills, fever, fatigue, generalized weakness.   Cardiovascular: Positive for chest pain, shortness of breath; Negative for leg swelling, palpitations, orthopnea, and syncope.   Respiratory: Negative for cough, shortness of breath, and wheezing.  HENT: Negative for ear pain, nosebleeds, and tinnitus.  Gastrointestinal: Negative for abdominal pain, constipation, diarrhea, nausea and vomiting.   Genitourinary: No urinary symptoms.  Musculoskeletal: Negative for muscle cramps.  Neurological: Negative for dizziness, headaches, loss of balance, numbness, and symptoms of  "stroke.  Psychiatric: Normal mental status.     All other systems reviewed and are negative.        Objective:     /72 (BP Location: Left arm, Patient Position: Sitting)   Pulse 76   Ht 158.8 cm (62.5\")   Wt 78.9 kg (174 lb)   LMP  (LMP Unknown)   SpO2 97%   BMI 31.32 kg/m²      Physical Exam  Constitutional: Patient appears well-developed and well-nourished.   HENT: HEENT exam unremarkable.   Neck: Neck supple. No JVD present. No carotid bruits.   Cardiovascular: Normal rate, regular rhythm and normal heart sounds. No murmur heard.   2+ symmetric pulses.   Pulmonary/Chest: Breath sounds normal. Does not exhibit tenderness.   Abdominal: Abdomen benign.   Musculoskeletal: Does not exhibit edema.   Neurological: Neurological exam unremarkable.   Vitals reviewed.    Data Review:     Lab Results   Component Value Date    CHLPL 214 (H) 01/04/2016    TRIG 180 (H) 01/04/2016    HDL 58 01/04/2016     (H) 01/04/2016         Procedures           Assessment:      Diagnosis Plan   1. Coronary artery disease involving native coronary artery of native heart with unstable angina pectoris (HCC)  Case Request Cath Lab: Left Heart Cath  Known nonobstructive CAD with features of unstable angina. We will proceed with C for further ischemic evaluation.    2. Essential hypertension  BP well controlled. Continue current antihypertensive regimen   3. Dyslipidemia  No recent lipid panel. Will obtain prior to LHC. Continue statin therapy.        Plan:   Patient with known nonobstructive CAD with chest pain and shortness of breath. We will plan for left heart catheterization for ischemic evaluation.   Rx SL Nitro for unstable angina.   Will obtain lipid panel prior to heart catheterization  Continue current medications.   FU after procedure, sooner as needed.  Thank you for allowing us to participate in the care of your patient.     Ashley Vega PA-C      Please note that portions of this note may have been " completed with a voice recognition program. Efforts were made to edit the dictations, but occasionally words are mistranscribed.       Patient Needs Assistance to Leave Residence...

## 2022-05-31 ENCOUNTER — OFFICE VISIT (OUTPATIENT)
Dept: CARDIOLOGY | Facility: CLINIC | Age: 62
End: 2022-05-31

## 2022-05-31 VITALS
WEIGHT: 174 LBS | DIASTOLIC BLOOD PRESSURE: 72 MMHG | HEART RATE: 76 BPM | BODY MASS INDEX: 30.83 KG/M2 | SYSTOLIC BLOOD PRESSURE: 132 MMHG | OXYGEN SATURATION: 97 % | HEIGHT: 63 IN

## 2022-05-31 DIAGNOSIS — I10 ESSENTIAL HYPERTENSION: ICD-10-CM

## 2022-05-31 DIAGNOSIS — I25.110 CORONARY ARTERY DISEASE INVOLVING NATIVE CORONARY ARTERY OF NATIVE HEART WITH UNSTABLE ANGINA PECTORIS: Primary | ICD-10-CM

## 2022-05-31 DIAGNOSIS — E78.5 DYSLIPIDEMIA: ICD-10-CM

## 2022-05-31 PROCEDURE — 99214 OFFICE O/P EST MOD 30 MIN: CPT | Performed by: INTERNAL MEDICINE

## 2022-05-31 RX ORDER — NITROGLYCERIN 0.4 MG/1
0.4 TABLET SUBLINGUAL
Qty: 25 TABLET | Refills: 3 | Status: SHIPPED | OUTPATIENT
Start: 2022-05-31 | End: 2023-03-14 | Stop reason: SDUPTHER

## 2022-06-06 ENCOUNTER — PREP FOR SURGERY (OUTPATIENT)
Dept: OTHER | Facility: HOSPITAL | Age: 62
End: 2022-06-06

## 2022-06-08 ENCOUNTER — PREP FOR SURGERY (OUTPATIENT)
Dept: OTHER | Facility: HOSPITAL | Age: 62
End: 2022-06-08

## 2022-06-08 RX ORDER — ASPIRIN 81 MG/1
81 TABLET ORAL DAILY
Status: CANCELLED | OUTPATIENT
Start: 2022-06-09

## 2022-06-08 RX ORDER — SODIUM CHLORIDE 0.9 % (FLUSH) 0.9 %
10 SYRINGE (ML) INJECTION AS NEEDED
Status: CANCELLED | OUTPATIENT
Start: 2022-06-08

## 2022-06-08 RX ORDER — ASPIRIN 81 MG/1
324 TABLET, CHEWABLE ORAL ONCE
Status: CANCELLED | OUTPATIENT
Start: 2022-06-08 | End: 2022-06-08

## 2022-06-08 RX ORDER — SODIUM CHLORIDE 0.9 % (FLUSH) 0.9 %
10 SYRINGE (ML) INJECTION EVERY 12 HOURS SCHEDULED
Status: CANCELLED | OUTPATIENT
Start: 2022-06-08

## 2022-06-15 ENCOUNTER — HOSPITAL ENCOUNTER (OUTPATIENT)
Facility: HOSPITAL | Age: 62
Setting detail: HOSPITAL OUTPATIENT SURGERY
Discharge: HOME | End: 2022-06-15
Attending: INTERNAL MEDICINE | Admitting: INTERNAL MEDICINE

## 2022-06-15 VITALS
WEIGHT: 172.2 LBS | TEMPERATURE: 97.7 F | HEIGHT: 62 IN | BODY MASS INDEX: 31.69 KG/M2 | RESPIRATION RATE: 16 BRPM | DIASTOLIC BLOOD PRESSURE: 57 MMHG | OXYGEN SATURATION: 96 % | HEART RATE: 67 BPM | SYSTOLIC BLOOD PRESSURE: 111 MMHG

## 2022-06-15 DIAGNOSIS — E78.5 DYSLIPIDEMIA: ICD-10-CM

## 2022-06-15 DIAGNOSIS — I10 PRIMARY HYPERTENSION: Primary | ICD-10-CM

## 2022-06-15 DIAGNOSIS — I25.10 CORONARY ARTERY DISEASE INVOLVING NATIVE CORONARY ARTERY OF NATIVE HEART WITHOUT ANGINA PECTORIS: ICD-10-CM

## 2022-06-15 DIAGNOSIS — I10 ESSENTIAL HYPERTENSION: ICD-10-CM

## 2022-06-15 PROBLEM — I25.110 CORONARY ARTERY DISEASE INVOLVING NATIVE CORONARY ARTERY WITH UNSTABLE ANGINA PECTORIS: Status: ACTIVE | Noted: 2018-09-25

## 2022-06-15 LAB
ACT BLD: 167 SECONDS (ref 82–152)
ALBUMIN SERPL-MCNC: 4.2 G/DL (ref 3.5–5.2)
ALBUMIN/GLOB SERPL: 1.6 G/DL
ALP SERPL-CCNC: 90 U/L (ref 39–117)
ALT SERPL W P-5'-P-CCNC: 18 U/L (ref 1–33)
ANION GAP SERPL CALCULATED.3IONS-SCNC: 10 MMOL/L (ref 5–15)
ANION GAP SERPL CALCULATED.3IONS-SCNC: 8 MMOL/L (ref 5–15)
AST SERPL-CCNC: 16 U/L (ref 1–32)
BILIRUB SERPL-MCNC: 0.2 MG/DL (ref 0–1.2)
BUN SERPL-MCNC: 15 MG/DL (ref 8–23)
BUN SERPL-MCNC: 16 MG/DL (ref 8–23)
BUN/CREAT SERPL: 16.9 (ref 7–25)
BUN/CREAT SERPL: 18.6 (ref 7–25)
CALCIUM SPEC-SCNC: 8.9 MG/DL (ref 8.6–10.5)
CALCIUM SPEC-SCNC: 9.7 MG/DL (ref 8.6–10.5)
CHLORIDE SERPL-SCNC: 103 MMOL/L (ref 98–107)
CHLORIDE SERPL-SCNC: 105 MMOL/L (ref 98–107)
CHOLEST SERPL-MCNC: 181 MG/DL (ref 0–200)
CO2 SERPL-SCNC: 23 MMOL/L (ref 22–29)
CO2 SERPL-SCNC: 24 MMOL/L (ref 22–29)
CREAT SERPL-MCNC: 0.86 MG/DL (ref 0.57–1)
CREAT SERPL-MCNC: 0.89 MG/DL (ref 0.57–1)
DEPRECATED RDW RBC AUTO: 46.9 FL (ref 37–54)
EGFRCR SERPLBLD CKD-EPI 2021: 72.5 ML/MIN/1.73
EGFRCR SERPLBLD CKD-EPI 2021: 75.5 ML/MIN/1.73
ERYTHROCYTE [DISTWIDTH] IN BLOOD BY AUTOMATED COUNT: 15.2 % (ref 12.3–15.4)
GLOBULIN UR ELPH-MCNC: 2.7 GM/DL
GLUCOSE SERPL-MCNC: 123 MG/DL (ref 65–99)
GLUCOSE SERPL-MCNC: 208 MG/DL (ref 65–99)
HBA1C MFR BLD: 6.8 % (ref 4.8–5.6)
HCT VFR BLD AUTO: 37.7 % (ref 34–46.6)
HDLC SERPL-MCNC: 64 MG/DL (ref 40–60)
HGB BLD-MCNC: 12 G/DL (ref 12–15.9)
LDLC SERPL CALC-MCNC: 88 MG/DL (ref 0–100)
LDLC/HDLC SERPL: 1.29 {RATIO}
MCH RBC QN AUTO: 27.1 PG (ref 26.6–33)
MCHC RBC AUTO-ENTMCNC: 31.8 G/DL (ref 31.5–35.7)
MCV RBC AUTO: 85.3 FL (ref 79–97)
PLATELET # BLD AUTO: 355 10*3/MM3 (ref 140–450)
PMV BLD AUTO: 10.4 FL (ref 6–12)
POTASSIUM SERPL-SCNC: 4.4 MMOL/L (ref 3.5–5.2)
POTASSIUM SERPL-SCNC: 5.5 MMOL/L (ref 3.5–5.2)
PROT SERPL-MCNC: 6.9 G/DL (ref 6–8.5)
RBC # BLD AUTO: 4.42 10*6/MM3 (ref 3.77–5.28)
SODIUM SERPL-SCNC: 134 MMOL/L (ref 136–145)
SODIUM SERPL-SCNC: 139 MMOL/L (ref 136–145)
TRIGL SERPL-MCNC: 171 MG/DL (ref 0–150)
VLDLC SERPL-MCNC: 29 MG/DL (ref 5–40)
WBC NRBC COR # BLD: 7.57 10*3/MM3 (ref 3.4–10.8)

## 2022-06-15 PROCEDURE — C1769 GUIDE WIRE: HCPCS | Performed by: INTERNAL MEDICINE

## 2022-06-15 PROCEDURE — 85347 COAGULATION TIME ACTIVATED: CPT

## 2022-06-15 PROCEDURE — 25010000002 MIDAZOLAM PER 1 MG: Performed by: INTERNAL MEDICINE

## 2022-06-15 PROCEDURE — C1874 STENT, COATED/COV W/DEL SYS: HCPCS | Performed by: INTERNAL MEDICINE

## 2022-06-15 PROCEDURE — 83036 HEMOGLOBIN GLYCOSYLATED A1C: CPT

## 2022-06-15 PROCEDURE — 0 IOPAMIDOL PER 1 ML: Performed by: INTERNAL MEDICINE

## 2022-06-15 PROCEDURE — 99153 MOD SED SAME PHYS/QHP EA: CPT | Performed by: INTERNAL MEDICINE

## 2022-06-15 PROCEDURE — C1887 CATHETER, GUIDING: HCPCS | Performed by: INTERNAL MEDICINE

## 2022-06-15 PROCEDURE — 92928 PRQ TCAT PLMT NTRAC ST 1 LES: CPT | Performed by: INTERNAL MEDICINE

## 2022-06-15 PROCEDURE — C1725 CATH, TRANSLUMIN NON-LASER: HCPCS | Performed by: INTERNAL MEDICINE

## 2022-06-15 PROCEDURE — 85027 COMPLETE CBC AUTOMATED: CPT

## 2022-06-15 PROCEDURE — C1894 INTRO/SHEATH, NON-LASER: HCPCS | Performed by: INTERNAL MEDICINE

## 2022-06-15 PROCEDURE — 80053 COMPREHEN METABOLIC PANEL: CPT

## 2022-06-15 PROCEDURE — 93458 L HRT ARTERY/VENTRICLE ANGIO: CPT | Performed by: INTERNAL MEDICINE

## 2022-06-15 PROCEDURE — 93005 ELECTROCARDIOGRAM TRACING: CPT | Performed by: INTERNAL MEDICINE

## 2022-06-15 PROCEDURE — C9600 PERC DRUG-EL COR STENT SING: HCPCS | Performed by: INTERNAL MEDICINE

## 2022-06-15 PROCEDURE — 99152 MOD SED SAME PHYS/QHP 5/>YRS: CPT | Performed by: INTERNAL MEDICINE

## 2022-06-15 PROCEDURE — 25010000002 HEPARIN (PORCINE) PER 1000 UNITS: Performed by: INTERNAL MEDICINE

## 2022-06-15 PROCEDURE — 80061 LIPID PANEL: CPT

## 2022-06-15 DEVICE — XIENCE SKYPOINT™ EVEROLIMUS ELUTING CORONARY STENT SYSTEM 2.25 MM X 18 MM / RAPID-EXCHANGE
Type: IMPLANTABLE DEVICE | Status: FUNCTIONAL
Brand: XIENCE SKYPOINT™

## 2022-06-15 DEVICE — XIENCE SKYPOINT™ EVEROLIMUS ELUTING CORONARY STENT SYSTEM 3.00 MM X 12 MM / RAPID-EXCHANGE
Type: IMPLANTABLE DEVICE | Status: FUNCTIONAL
Brand: XIENCE SKYPOINT™

## 2022-06-15 RX ORDER — SODIUM CHLORIDE 0.9 % (FLUSH) 0.9 %
10 SYRINGE (ML) INJECTION AS NEEDED
Status: DISCONTINUED | OUTPATIENT
Start: 2022-06-15 | End: 2022-06-15 | Stop reason: HOSPADM

## 2022-06-15 RX ORDER — SODIUM CHLORIDE 9 MG/ML
100 INJECTION, SOLUTION INTRAVENOUS CONTINUOUS
Status: DISCONTINUED | OUTPATIENT
Start: 2022-06-15 | End: 2022-06-15 | Stop reason: HOSPADM

## 2022-06-15 RX ORDER — HEPARIN SODIUM 1000 [USP'U]/ML
INJECTION, SOLUTION INTRAVENOUS; SUBCUTANEOUS AS NEEDED
Status: DISCONTINUED | OUTPATIENT
Start: 2022-06-15 | End: 2022-06-15 | Stop reason: HOSPADM

## 2022-06-15 RX ORDER — LIDOCAINE HYDROCHLORIDE 10 MG/ML
INJECTION, SOLUTION EPIDURAL; INFILTRATION; INTRACAUDAL; PERINEURAL AS NEEDED
Status: DISCONTINUED | OUTPATIENT
Start: 2022-06-15 | End: 2022-06-15 | Stop reason: HOSPADM

## 2022-06-15 RX ORDER — SODIUM CHLORIDE 0.9 % (FLUSH) 0.9 %
10 SYRINGE (ML) INJECTION EVERY 12 HOURS SCHEDULED
Status: DISCONTINUED | OUTPATIENT
Start: 2022-06-15 | End: 2022-06-15 | Stop reason: HOSPADM

## 2022-06-15 RX ORDER — CLOPIDOGREL BISULFATE 75 MG/1
TABLET ORAL AS NEEDED
Status: DISCONTINUED | OUTPATIENT
Start: 2022-06-15 | End: 2022-06-15 | Stop reason: HOSPADM

## 2022-06-15 RX ORDER — ASPIRIN 81 MG/1
81 TABLET ORAL DAILY
Status: DISCONTINUED | OUTPATIENT
Start: 2022-06-16 | End: 2022-06-15 | Stop reason: HOSPADM

## 2022-06-15 RX ORDER — ASPIRIN 81 MG/1
324 TABLET, CHEWABLE ORAL ONCE
Status: COMPLETED | OUTPATIENT
Start: 2022-06-15 | End: 2022-06-15

## 2022-06-15 RX ORDER — SODIUM CHLORIDE 9 MG/ML
3 INJECTION, SOLUTION INTRAVENOUS CONTINUOUS
Status: ACTIVE | OUTPATIENT
Start: 2022-06-15 | End: 2022-06-15

## 2022-06-15 RX ORDER — MIDAZOLAM HYDROCHLORIDE 1 MG/ML
INJECTION INTRAMUSCULAR; INTRAVENOUS AS NEEDED
Status: DISCONTINUED | OUTPATIENT
Start: 2022-06-15 | End: 2022-06-15 | Stop reason: HOSPADM

## 2022-06-15 RX ORDER — ACETAMINOPHEN 325 MG/1
650 TABLET ORAL EVERY 4 HOURS PRN
Status: DISCONTINUED | OUTPATIENT
Start: 2022-06-15 | End: 2022-06-15 | Stop reason: HOSPADM

## 2022-06-15 RX ORDER — CLOPIDOGREL BISULFATE 75 MG/1
75 TABLET ORAL DAILY
Qty: 90 TABLET | Refills: 3 | Status: SHIPPED | OUTPATIENT
Start: 2022-06-15

## 2022-06-15 RX ORDER — ROSUVASTATIN CALCIUM 10 MG/1
5 TABLET, COATED ORAL 3 TIMES WEEKLY
Qty: 90 TABLET | Refills: 3 | Status: SHIPPED | OUTPATIENT
Start: 2022-06-15

## 2022-06-15 RX ADMIN — ASPIRIN 81 MG CHEWABLE TABLET 324 MG: 81 TABLET CHEWABLE at 07:51

## 2022-06-15 RX ADMIN — SODIUM CHLORIDE 3 ML/KG/HR: 9 INJECTION, SOLUTION INTRAVENOUS at 07:49

## 2022-06-15 NOTE — INTERVAL H&P NOTE
H&P reviewed. The patient was examined and there are no changes to the H&P.      Vitals:    06/15/22 0728   BP: 119/66   Pulse: 65   Resp: 18   Temp: 97.7 °F (36.5 °C)   SpO2: 96%     Labs pending.  Will review when available.    Left Vini's: Normal  Right Vini's: Not assessed    Patient is a 62-year-old female with known nonobstructive CAD who presented to the clinic with features of unstable angina and presents today for left heart catheterization +/- catheter-based intervention.  The risks, benefits, and alternatives were discussed with the patient she wishes to proceed.    Ashley Vega PA-C

## 2022-06-16 ENCOUNTER — CALL CENTER PROGRAMS (OUTPATIENT)
Dept: CALL CENTER | Facility: HOSPITAL | Age: 62
End: 2022-06-16

## 2022-06-16 ENCOUNTER — DOCUMENTATION (OUTPATIENT)
Dept: CARDIAC REHAB | Facility: HOSPITAL | Age: 62
End: 2022-06-16

## 2022-06-16 NOTE — OUTREACH NOTE
PCI/Device Survey    Flowsheet Row Responses   Facility patient discharged from? [facility]   Procedure date 06/15/22   Procedure (if device, specify in description) PCI   PCI site Left, Arm   Performing MD Dr. Barber Mcdonnell   Attempt successful? No   Unsuccessful attempts Attempt 1          JEANA LEON - Registered Nurse

## 2022-06-16 NOTE — OUTREACH NOTE
PCI/Device Survey    Flowsheet Row Responses   Facility patient discharged from? [facility]   Procedure date 06/15/22   Procedure (if device, specify in description) PCI   PCI site Left, Arm   Performing MD Dr. Barber Mcdonnell   Attempt successful? No   Unsuccessful attempts Attempt 2          JEANA LEON - Registered Nurse

## 2022-06-20 ENCOUNTER — DOCUMENTATION (OUTPATIENT)
Dept: CARDIAC REHAB | Facility: HOSPITAL | Age: 62
End: 2022-06-20

## 2022-06-20 NOTE — PROGRESS NOTES
Cardiac Rehab staff mailed referral letter to patient regarding Phase II Cardiac Rehab program. Instruction for patient to contact [facility] Cardiac Rehab Department for additional program information and to forward referral to closest facility.

## 2022-08-10 LAB
QT INTERVAL: 414 MS
QTC INTERVAL: 449 MS

## 2022-08-24 NOTE — PROGRESS NOTES
[facility] Cardiology    Encounter Date: 2022    Patient ID: Andreea Lopes is a 62 y.o. female.  : 1960     PCP: Shoshana Yee MD       Chief Complaint: Coronary Artery Disease      PROBLEM LIST:  1. Nonobstructive CAD with chronic recurrent CP:  a. Multiple previous negative MPS.  b. Positive MPS, 2015: Anterior and anterolateral ischemia. LVEF 74%.  c. [facility], 2016, Dr. Mcdonnell: Nonobstructive CAD with 30-40% proximal LAD, 30-40% mid LAD, and 50% distal LAD stenosis of small caliber segment of vessel.  Circumflex had 10-20% proximal plaque. LVEF 65%.  d. Medical management and risk factor modification.  e. [facility], 06/15/2022; EF 65%. Moderate to severe single-vessel CAD involving the left circumflex coronary artery with proximal and mid segment stenosis.  This vessel is now status post 3.0 x 12 mm LUDWIN to the proximal circumflex and 2.25 x 18 mm LUDWIN to the mid circumflex reducing both stenosis to 0%. Mild to moderate nonobstructive disease of the LAD and the RCA to be managed with medical therapy. Dr. Mcdonnell.   2. PACs/PVCs:  a. 24h Holter, 2020: SR with 1% PACs and 0.8% PVCs. One short SVT (4 beats)  3. Hypertension  4. Dyslipidemia  5. DM 2  6. Obesity with BMI 33  7. Anxiety  8. Hypothyroidism, on chronic respiratory therapy.  9. GERD  10. Surgical history:  a. Hysterectomy.       History of Present Illness  Patient presents today for a follow-up s/p [facility] with a history of coronary artery disease and cardiac risk factors. Since last visit, the patient has been doing well overall from a cardiovascular standpoint. Patient maintains an active and busy lifestyle by babysitting her grandchildren. She notes that she does not maintain a healthy diet. Patient denies chest pain, shortness of breath, orthopnea, palpitations, edema, dizziness, and syncope.      No Known Allergies      Current Outpatient Medications:   •  ALPRAZolam (XANAX) 0.5 MG tablet, Take 0.5 mg by  mouth 2 (Two) Times a Day As Needed for Anxiety., Disp: , Rfl:   •  amLODIPine (NORVASC) 5 MG tablet, Take 1 tablet by mouth Daily., Disp: 90 tablet, Rfl: 3  •  aspirin 325 MG tablet, Take 325 mg by mouth Daily., Disp: , Rfl:   •  CALCIUM-VITAMIN D PO, Take 1 tablet by mouth Daily., Disp: , Rfl:   •  cholecalciferol (VITAMIN D3) 1000 UNITS tablet, Take 1,000 Units by mouth Daily., Disp: , Rfl:   •  clopidogrel (PLAVIX) 75 MG tablet, Take 1 tablet by mouth Daily., Disp: 90 tablet, Rfl: 3  •  ibuprofen (ADVIL,MOTRIN) 600 MG tablet, Take 600 mg by mouth Every 6 (Six) Hours As Needed for Mild Pain ., Disp: , Rfl:   •  LEVOTHYROXINE SODIUM PO, Take 62 mcg by mouth Daily., Disp: , Rfl:   •  lisinopril (PRINIVIL,ZESTRIL) 40 MG tablet, Take 40 mg by mouth., Disp: , Rfl:   •  MAGNESIUM PO, Take 1 tablet by mouth Every Night., Disp: , Rfl:   •  metFORMIN (GLUCOPHAGE) 500 MG tablet, Take 500 mg by mouth 2 (Two) Times a Day With Meals., Disp: , Rfl:   •  metoprolol tartrate (LOPRESSOR) 50 MG tablet, Take 50 mg by mouth Every 12 (Twelve) Hours. Patient doesn't take BID everyday, Disp: , Rfl:   •  nitroglycerin (NITROSTAT) 0.4 MG SL tablet, Place 1 tablet under the tongue Every 5 (Five) Minutes As Needed for Chest Pain. Take no more than 3 doses in 15 minutes., Disp: 25 tablet, Rfl: 3  •  ranolazine (Ranexa) 500 MG 12 hr tablet, Take 1 tablet by mouth 2 (Two) Times a Day., Disp: 180 tablet, Rfl: 3  •  rosuvastatin (CRESTOR) 10 MG tablet, Take 0.5 tablets by mouth 3 (Three) Times a Week., Disp: 90 tablet, Rfl: 3    The following portions of the patient's history were reviewed and updated as appropriate: allergies, current medications, past family history, past medical history, past social history, past surgical history and problem list.    ROS  Review of Systems   Constitution: Negative for chills, fever, fatigue, generalized weakness.   Cardiovascular: Negative for chest pain, dyspnea on exertion, leg swelling, palpitations,  "orthopnea, and syncope.   Respiratory: Negative for cough, shortness of breath, and wheezing.  HENT: Negative for ear pain, nosebleeds, and tinnitus.  Gastrointestinal: Negative for abdominal pain, constipation, diarrhea, nausea and vomiting.   Genitourinary: No urinary symptoms.  Musculoskeletal: Negative for muscle cramps.  Neurological: Negative for dizziness, headaches, loss of balance, numbness, and symptoms of stroke.  Psychiatric: Normal mental status.     All other systems reviewed and are negative.        Objective:     /70 (BP Location: Left arm, Patient Position: Sitting)   Pulse 79   Ht 157.5 cm (62\")   Wt 78.5 kg (173 lb)   LMP  (LMP Unknown)   SpO2 99%   BMI 31.64 kg/m²      Physical Exam  Constitutional: Patient appears well-developed and well-nourished.   HENT: HEENT exam unremarkable.   Neck: Neck supple. No JVD present. No carotid bruits.   Cardiovascular: Normal rate, regular rhythm and normal heart sounds. No murmur heard.   2+ symmetric pulses.   Pulmonary/Chest: Breath sounds normal. Does not exhibit tenderness.   Abdominal: Abdomen benign.   Musculoskeletal: Does not exhibit edema.   Neurological: Neurological exam unremarkable.   Vitals reviewed.    Data Review:     Lab date: 06/23/2022  • FLP: , , HDL 74, LDL 57  • CMP: Glu 119, BUN 11, Creat 0.95, eGFR 59, Na 140, K 4.6, Cl 105, CO2 25, Ca 9.9, Alk Phos 92, AST 21, ALT 21  • CBC: WBC 10.2, RBC 4.48, HGB 11.9, HCT 36.1, MCV 80.7, MCH 26.7,         Procedures             Assessment:      Diagnosis Plan   1. Coronary artery disease involving native coronary artery of native heart without angina pectoris  Stable without angina on current activity. Continue aspirin 325 mg and Plavix 75 mg daily for DAPT. Continue on Ranexa 500 mg daily for chest pain.     2. Essential hypertension  Well controlled.Continue on amlodipine 5 mg and lisinopril 40 mg. Continue on metoprolol 50 mg q12h for rate control and hypertension.  "    3. Dyslipidemia  Well controlled. Continue on rosuvastatin 5 mg daily for hyperlipidemia.       Plan:   Stable cardiac status.  Reports significant improvement of symptoms following coronary stent, no current angina or CHF.  Heart healthy diet and regular exercise recommended.   Continue current medications.   FU in 6 MO, sooner as needed.  Thank you for allowing us to participate in the care of your patient.       Scribed for Barber Mcdonnell MD by Bridgett Das. 8/30/2022 11:21 EDT         I, Barber Mcdonnell MD, personally performed the services described in this documentation as scribed by the above named individual in my presence, and it is both accurate and complete.  8/30/2022  13:55 EDT      Please note that portions of this note may have been completed with a voice recognition program. Efforts were made to edit the dictations, but occasionally words are mistranscribed.

## 2022-08-30 ENCOUNTER — OFFICE VISIT (OUTPATIENT)
Dept: CARDIOLOGY | Facility: CLINIC | Age: 62
End: 2022-08-30

## 2022-08-30 VITALS
DIASTOLIC BLOOD PRESSURE: 70 MMHG | SYSTOLIC BLOOD PRESSURE: 110 MMHG | BODY MASS INDEX: 31.83 KG/M2 | WEIGHT: 173 LBS | HEART RATE: 79 BPM | OXYGEN SATURATION: 99 % | HEIGHT: 62 IN

## 2022-08-30 DIAGNOSIS — E78.5 DYSLIPIDEMIA: ICD-10-CM

## 2022-08-30 DIAGNOSIS — I10 ESSENTIAL HYPERTENSION: ICD-10-CM

## 2022-08-30 DIAGNOSIS — I25.10 CORONARY ARTERY DISEASE INVOLVING NATIVE CORONARY ARTERY OF NATIVE HEART WITHOUT ANGINA PECTORIS: Primary | ICD-10-CM

## 2022-08-30 PROCEDURE — 99214 OFFICE O/P EST MOD 30 MIN: CPT | Performed by: INTERNAL MEDICINE

## 2022-09-21 ENCOUNTER — TELEPHONE (OUTPATIENT)
Dept: SURGERY | Facility: CLINIC | Age: 62
End: 2022-09-21

## 2022-09-21 NOTE — TELEPHONE ENCOUNTER
Provider: RAYMOND SUMMERS    Caller: [facility] (REF. PROVIDER)    Phone Number: [phone]    Reason for Call: SOPHIE WITH [facility] (REF. PROVIDER'S OFFICE) WAS CALLING TO SEE IF PT HAD HER COLONOSCOPY WITH DR. SUMMERS. DID SEE IN HER CHART WHERE IT SEEMED DR. SUMMERS WAS WAITING ON CARDIOLOGY CLEARANCE. SHE'S HAD HER CARDIOLOGY APPT 8/30/22. SOPHIE WAS WONDERING IF THERE'S ANYTHING ELSE NEEDED AND COULD WE CALL PT TO GET COLONOSCOPY SCHEDULED.

## 2022-09-23 NOTE — TELEPHONE ENCOUNTER
PRESCREENING FOR OPEN ACCESS SCHEDULING    Andreea Lopes, 1960  [id number]    09/23/22    If, the patient answers yes to any of the following questions the provider will be informed prior to scheduling open access for approval and documented in the chart.    []  Yes  [] No    1. Have you ever had a colonoscopy in the past?      When:        Where:       Polyps or other:     [x]  Yes  [] No    2. Family history of colon cancer?      Relation:       Age of onset:       Do you currently have any of the following?    []  Yes  [x] No  Rectal bleeding, if so, how long?     []  Yes  [x] No  Abdominal pain, if so, how long?    []  Yes  [x] No  Constipation, if so, how long?    []  Yes  [x] No  Diarrhea, if so, how long?    []  Yes  [x] No  Weight loss, is so, how much?    [] Yes  [x] No  Small caliber stool, if so, how long?      Have you ever had any of the following conditions?    [] Yes  [x] No  Heart attack?      When?       Last cardiac workup?     Blood thinners?    [] Yes  [x] No   Lung problems, asthma or COPD?  [] Yes  [x] No  Oxygen required?       [] Yes  [x] No  Stroke?     [] Yes  [x] No  Have you ever had a reaction to anesthesia?

## 2022-09-29 ENCOUNTER — TELEPHONE (OUTPATIENT)
Dept: SURGERY | Facility: CLINIC | Age: 62
End: 2022-09-29

## 2022-10-10 RX ORDER — POLYETHYLENE GLYCOL 3350 17 G/17G
238 POWDER, FOR SOLUTION ORAL ONCE
Qty: 17 PACKET | Refills: 0 | Status: SHIPPED | OUTPATIENT
Start: 2022-10-10 | End: 2022-10-10

## 2022-10-10 RX ORDER — BISACODYL 5 MG
5 TABLET, DELAYED RELEASE (ENTERIC COATED) ORAL DAILY PRN
Qty: 4 TABLET | Refills: 0 | Status: SHIPPED | OUTPATIENT
Start: 2022-10-10 | End: 2023-02-20

## 2022-10-10 NOTE — TELEPHONE ENCOUNTER
Patient contacted, advised that per Dr. Romero, patient is okay to follow the recommendation of Dr. Mcdonnell prior to colonoscopy, it would be what he would recommend for her. Verbalized understanding.

## 2022-10-19 ENCOUNTER — TELEPHONE (OUTPATIENT)
Dept: SURGERY | Facility: CLINIC | Age: 62
End: 2022-10-19

## 2023-01-23 RX ORDER — RANOLAZINE 500 MG/1
TABLET, EXTENDED RELEASE ORAL
Qty: 180 TABLET | Refills: 1 | Status: SHIPPED | OUTPATIENT
Start: 2023-01-23

## 2023-02-16 NOTE — PROGRESS NOTES
Patient: Andreea Lopes    YOB: 1960    Date: 02/20/2023    Primary Care Provider: Candy Uribe APRN    Chief Complaint   Patient presents with   • Abdominal Pain       SUBJECTIVE:    History of present illness: Patient states that she has had a 3-month history of moderate severity abdominal pain.  Mainly in the right lower abdomen and across the lower abdomen.  Sometimes has nausea but no vomiting.  No constipation or diarrhea.  She went to the emergency room in January and was told she had diverticulitis and was placed on antibiotics for this.  She has had some improvement.    The following portions of the patient's history were reviewed and updated as appropriate: allergies, current medications, past family history, past medical history, past social history, past surgical history and problem list.      Review of Systems   Constitutional: Negative for activity change, chills, fever and unexpected weight change.   HENT: Negative for hearing loss, trouble swallowing and voice change.    Eyes: Negative for visual disturbance.   Respiratory: Negative for apnea, cough, chest tightness, shortness of breath and wheezing.    Cardiovascular: Negative for chest pain, palpitations and leg swelling.   Gastrointestinal: Positive for abdominal pain and nausea. Negative for abdominal distention, anal bleeding, blood in stool, constipation, diarrhea, rectal pain and vomiting.   Endocrine: Negative for cold intolerance and heat intolerance.   Genitourinary: Negative for difficulty urinating, dysuria and flank pain.   Musculoskeletal: Negative for back pain and gait problem.   Skin: Negative for color change, rash and wound.   Neurological: Negative for dizziness, syncope, speech difficulty, weakness, light-headedness, numbness and headaches.   Hematological: Negative for adenopathy. Does not bruise/bleed easily.   Psychiatric/Behavioral: Negative for confusion. The patient is not nervous/anxious.         Allergies:  No Known Allergies    Medications:    Current Outpatient Medications:   •  ALPRAZolam (XANAX) 0.5 MG tablet, Take 0.5 mg by mouth 2 (Two) Times a Day As Needed for Anxiety., Disp: , Rfl:   •  amLODIPine (NORVASC) 5 MG tablet, Take 1 tablet by mouth Daily., Disp: 90 tablet, Rfl: 3  •  aspirin 325 MG tablet, Take 325 mg by mouth Daily., Disp: , Rfl:   •  bisacodyl (Dulcolax) 5 MG EC tablet, Take 1 tablet by mouth Daily As Needed for Constipation. Take 2 tablets 3pm and take 2 tablets at 5pm, Disp: 4 tablet, Rfl: 0  •  CALCIUM-VITAMIN D PO, Take 1 tablet by mouth Daily., Disp: , Rfl:   •  cholecalciferol (VITAMIN D3) 1000 UNITS tablet, Take 1,000 Units by mouth Daily., Disp: , Rfl:   •  clopidogrel (PLAVIX) 75 MG tablet, Take 1 tablet by mouth Daily., Disp: 90 tablet, Rfl: 3  •  ibuprofen (ADVIL,MOTRIN) 600 MG tablet, Take 600 mg by mouth Every 6 (Six) Hours As Needed for Mild Pain ., Disp: , Rfl:   •  Januvia 100 MG tablet, Take 1 tablet by mouth Daily., Disp: , Rfl:   •  LEVOTHYROXINE SODIUM PO, Take 62 mcg by mouth Daily., Disp: , Rfl:   •  lisinopril (PRINIVIL,ZESTRIL) 40 MG tablet, Take 40 mg by mouth., Disp: , Rfl:   •  MAGNESIUM PO, Take 1 tablet by mouth Every Night., Disp: , Rfl:   •  metFORMIN (GLUCOPHAGE) 500 MG tablet, Take 500 mg by mouth 2 (Two) Times a Day With Meals., Disp: , Rfl:   •  metoprolol tartrate (LOPRESSOR) 50 MG tablet, Take 50 mg by mouth Every 12 (Twelve) Hours. Patient doesn't take BID everyday, Disp: , Rfl:   •  nitroglycerin (NITROSTAT) 0.4 MG SL tablet, Place 1 tablet under the tongue Every 5 (Five) Minutes As Needed for Chest Pain. Take no more than 3 doses in 15 minutes., Disp: 25 tablet, Rfl: 3  •  pantoprazole (PROTONIX) 40 MG EC tablet, Take 1 tablet by mouth Daily., Disp: , Rfl:   •  ranolazine (RANEXA) 500 MG 12 hr tablet, TAKE 1 TABLET BY MOUTH 2 TIMES A DAY, Disp: 180 tablet, Rfl: 1  •  rosuvastatin (CRESTOR) 10 MG tablet, Take 0.5 tablets by mouth 3 (Three)  "Times a Week., Disp: 90 tablet, Rfl: 3  •  traMADol (ULTRAM) 50 MG tablet, Take 50 mg by mouth Every 6 (Six) Hours As Needed., Disp: , Rfl:     History:  Past Medical History:   Diagnosis Date   • Anxiety    • Chest pain     Chronic recurrent   • Diabetes mellitus (HCC)     Type 2   • Dyslipidemia     On Statin therapy   • GERD (gastroesophageal reflux disease)    • Hyperlipidemia    • Hypertension    • Hypothyroidism     on chronic replacement therapy   • Obesity     BMI 33       Past Surgical History:   Procedure Laterality Date   • BREAST BIOPSY     • CARDIAC CATHETERIZATION     • CARDIAC CATHETERIZATION Left 6/15/2022    Procedure: Left Heart Cath;  Surgeon: Barber Mcdonnell MD;  Location: [facility] CATH INVASIVE LOCATION;  Service: Cardiology;  Laterality: Left;   • HYSTERECTOMY     • OOPHORECTOMY         Family History   Problem Relation Age of Onset   • Colon cancer Father 55   • Cancer Father    • Breast cancer Maternal Aunt 48   • Heart disease Mother    • Cancer Sister    • Heart disease Sister    • Heart disease Brother    • Cancer Paternal Uncle    • Heart disease Paternal Grandfather        Social History     Tobacco Use   • Smoking status: Never   • Smokeless tobacco: Never   Substance Use Topics   • Alcohol use: No   • Drug use: No        OBJECTIVE:    Vital Signs:   Vitals:    02/20/23 1311   BP: 130/72   Pulse: 75   Temp: 96.9 °F (36.1 °C)   SpO2: 97%   Weight: 76.6 kg (168 lb 12.8 oz)   Height: 157.5 cm (62\")       Physical Exam:   General Appearance:    Alert, cooperative, in no acute distress   Head:    Normocephalic, without obvious abnormality, atraumatic   Eyes:            Normal.  No scleral icterus.  PERRLA    Lungs:     Clear to auscultation,respirations regular, even and                  unlabored    Heart:    Regular rhythm and normal rate, normal S1 and S2, no            murmur   Abdomen:     Normal bowel sounds, no masses, no organomegaly, soft        non-tender, non-distended, no guarding,  "   Extremities:   Moves all extremities well, no edema, no cyanosis, no             redness   Skin:   No bleeding, bruising or rash   Neurologic:   Normal without gross deficits.   Psychiatric: No evidence of depression or anxiety        Results Review:   I reviewed the patient's new clinical results.  Labs and CT results were reviewed    Review of Systems was reviewed and confirmed as accurate as documented by the MA.    ASSESSMENT/PLAN:    1. Lower abdominal pain    2. Iron deficiency anemia, unspecified iron deficiency anemia type          Unclear as to the etiology of her lower abdominal pain.  No abnormality found on CT scan.  Physical examination unremarkable.  Due to the abdominal pain and her iron deficiency anemia I do recommend an EGD and colonoscopy.  I explained the procedures to the patient as well as the risks of bleeding and perforation and they understand the ramifications of these potential complications and they wish to proceed.  I will also Hemoccult her stool    Electronically signed by Ramírez Romero MD  02/20/23

## 2023-02-20 ENCOUNTER — OFFICE VISIT (OUTPATIENT)
Dept: SURGERY | Facility: CLINIC | Age: 63
End: 2023-02-20
Payer: MEDICAID

## 2023-02-20 VITALS
HEART RATE: 75 BPM | WEIGHT: 168.8 LBS | HEIGHT: 62 IN | TEMPERATURE: 96.9 F | BODY MASS INDEX: 31.06 KG/M2 | DIASTOLIC BLOOD PRESSURE: 72 MMHG | SYSTOLIC BLOOD PRESSURE: 130 MMHG | OXYGEN SATURATION: 97 %

## 2023-02-20 DIAGNOSIS — D50.9 IRON DEFICIENCY ANEMIA, UNSPECIFIED IRON DEFICIENCY ANEMIA TYPE: ICD-10-CM

## 2023-02-20 DIAGNOSIS — R10.30 LOWER ABDOMINAL PAIN: Primary | ICD-10-CM

## 2023-02-20 PROCEDURE — 99214 OFFICE O/P EST MOD 30 MIN: CPT | Performed by: SURGERY

## 2023-02-20 RX ORDER — TRAMADOL HYDROCHLORIDE 50 MG/1
50 TABLET ORAL EVERY 6 HOURS PRN
COMMUNITY
Start: 2023-01-06 | End: 2023-03-14 | Stop reason: ALTCHOICE

## 2023-02-20 RX ORDER — SITAGLIPTIN 100 MG/1
1 TABLET, FILM COATED ORAL DAILY
COMMUNITY
Start: 2023-02-15

## 2023-02-20 RX ORDER — BISACODYL 5 MG
5 TABLET, DELAYED RELEASE (ENTERIC COATED) ORAL TAKE AS DIRECTED
Qty: 4 TABLET | Refills: 0 | Status: SHIPPED | OUTPATIENT
Start: 2023-02-20 | End: 2024-02-20

## 2023-02-20 RX ORDER — PANTOPRAZOLE SODIUM 40 MG/1
1 TABLET, DELAYED RELEASE ORAL DAILY
COMMUNITY
Start: 2022-12-21

## 2023-02-20 RX ORDER — POLYETHYLENE GLYCOL 3350 17 G/17G
238 POWDER, FOR SOLUTION ORAL ONCE
Qty: 17 PACKET | Refills: 0 | Status: SHIPPED | OUTPATIENT
Start: 2023-02-20 | End: 2023-02-20

## 2023-03-14 ENCOUNTER — OFFICE VISIT (OUTPATIENT)
Dept: CARDIOLOGY | Facility: CLINIC | Age: 63
End: 2023-03-14
Payer: MEDICAID

## 2023-03-14 VITALS
DIASTOLIC BLOOD PRESSURE: 73 MMHG | HEIGHT: 62 IN | HEART RATE: 68 BPM | WEIGHT: 169 LBS | SYSTOLIC BLOOD PRESSURE: 116 MMHG | OXYGEN SATURATION: 97 % | BODY MASS INDEX: 31.1 KG/M2

## 2023-03-14 DIAGNOSIS — I10 ESSENTIAL HYPERTENSION: ICD-10-CM

## 2023-03-14 DIAGNOSIS — I25.10 CORONARY ARTERY DISEASE INVOLVING NATIVE CORONARY ARTERY OF NATIVE HEART WITHOUT ANGINA PECTORIS: Primary | ICD-10-CM

## 2023-03-14 DIAGNOSIS — E78.5 DYSLIPIDEMIA: ICD-10-CM

## 2023-03-14 PROCEDURE — 1160F RVW MEDS BY RX/DR IN RCRD: CPT

## 2023-03-14 PROCEDURE — 1159F MED LIST DOCD IN RCRD: CPT

## 2023-03-14 PROCEDURE — 3074F SYST BP LT 130 MM HG: CPT

## 2023-03-14 PROCEDURE — 3078F DIAST BP <80 MM HG: CPT

## 2023-03-14 PROCEDURE — 99214 OFFICE O/P EST MOD 30 MIN: CPT

## 2023-03-14 RX ORDER — NITROGLYCERIN 0.4 MG/1
0.4 TABLET SUBLINGUAL
Qty: 25 TABLET | Refills: 3 | Status: SHIPPED | OUTPATIENT
Start: 2023-03-14

## 2023-03-14 NOTE — PROGRESS NOTES
[facility] Cardiology    Encounter Date: 2023    Patient ID: Andreea Lopes is a 62 y.o. female.  : 1960     PCP: Candy Uribe APRN       Chief Complaint: Coronary Artery Disease      PROBLEM LIST:  1. Nonobstructive CAD with chronic recurrent CP:  a. Multiple previous negative MPS.  b. Positive MPS, 2015: Anterior and anterolateral ischemia. LVEF 74%.  c. [facility], 2016, Dr. Mcdonnell: Nonobstructive CAD with 30-40% proximal LAD, 30-40% mid LAD, and 50% distal LAD stenosis of small caliber segment of vessel.  Circumflex had 10-20% proximal plaque. LVEF 65%.  d. Medical management and risk factor modification.  e. [facility], 06/15/2022; EF 65%. Moderate to severe single-vessel CAD involving the left circumflex coronary artery with proximal and mid segment stenosis.  This vessel is now status post 3.0 x 12 mm LUDWIN to the proximal circumflex and 2.25 x 18 mm LUDWIN to the mid circumflex reducing both stenosis to 0%. Mild to moderate nonobstructive disease of the LAD and the RCA to be managed with medical therapy. Dr. Mcdonnell.   2. PACs/PVCs:  a. 24h Holter, 2020: SR with 1% PACs and 0.8% PVCs. One short SVT (4 beats)  3. Hypertension  4. Dyslipidemia  5. DM 2  6. Obesity with BMI 33  7. Anxiety  8. Hypothyroidism, on chronic respiratory therapy.  9. GERD  10. Surgical history:  a. Hysterectomy.    History of Present Illness  Patient presents today for 6-month follow-up with a history of coronary artery disease, PACs/PVCs, and cardiac risk factors. Since last visit, patient has been doing well from a cardiac standpoint. She has started having some atypical chest discomfort which she describes as a dull pain. This is not made worse with exertion and appears random. It can last seconds or all day. She denies any shortness of breath, orthopnea, edema, pre-syncope or syncope.     No Known Allergies      Current Outpatient Medications:   •  ALPRAZolam (XANAX) 0.5 MG tablet,  Take 1 tablet by mouth 2 (Two) Times a Day As Needed for Anxiety., Disp: , Rfl:   •  amLODIPine (NORVASC) 5 MG tablet, Take 1 tablet by mouth Daily., Disp: 90 tablet, Rfl: 3  •  aspirin 325 MG tablet, Take 1 tablet by mouth Daily., Disp: , Rfl:   •  bisacodyl (Dulcolax) 5 MG EC tablet, Take 1 tablet by mouth Take As Directed. Take 2 tablets 3pm and take 2 tablets at 5pm, Disp: 4 tablet, Rfl: 0  •  cholecalciferol (VITAMIN D3) 1000 UNITS tablet, Take 1 tablet by mouth Daily., Disp: , Rfl:   •  clopidogrel (PLAVIX) 75 MG tablet, Take 1 tablet by mouth Daily., Disp: 90 tablet, Rfl: 3  •  ibuprofen (ADVIL,MOTRIN) 600 MG tablet, Take 1 tablet by mouth Every 6 (Six) Hours As Needed for Mild Pain., Disp: , Rfl:   •  Januvia 100 MG tablet, Take 1 tablet by mouth Daily., Disp: , Rfl:   •  LEVOTHYROXINE SODIUM PO, Take 62 mcg by mouth Daily., Disp: , Rfl:   •  lisinopril (PRINIVIL,ZESTRIL) 40 MG tablet, Take 1 tablet by mouth., Disp: , Rfl:   •  MAGNESIUM PO, Take 1 tablet by mouth Every Night., Disp: , Rfl:   •  metFORMIN (GLUCOPHAGE) 500 MG tablet, Take 1 tablet by mouth 2 (Two) Times a Day With Meals., Disp: , Rfl:   •  metoprolol tartrate (LOPRESSOR) 50 MG tablet, Take 1 tablet by mouth Every 12 (Twelve) Hours. Patient doesn't take BID everyday, Disp: , Rfl:   •  nitroglycerin (NITROSTAT) 0.4 MG SL tablet, Place 1 tablet under the tongue Every 5 (Five) Minutes As Needed for Chest Pain. Take no more than 3 doses in 15 minutes., Disp: 25 tablet, Rfl: 3  •  pantoprazole (PROTONIX) 40 MG EC tablet, Take 1 tablet by mouth Daily., Disp: , Rfl:   •  ranolazine (RANEXA) 500 MG 12 hr tablet, TAKE 1 TABLET BY MOUTH 2 TIMES A DAY, Disp: 180 tablet, Rfl: 1  •  rosuvastatin (CRESTOR) 10 MG tablet, Take 0.5 tablets by mouth 3 (Three) Times a Week., Disp: 90 tablet, Rfl: 3  •  CALCIUM-VITAMIN D PO, Take 1 tablet by mouth Daily., Disp: , Rfl:   •  traMADol (ULTRAM) 50 MG tablet, Take 1 tablet by mouth Every 6 (Six) Hours As Needed.,  "Disp: , Rfl:     The following portions of the patient's history were reviewed and updated as appropriate: allergies, current medications, past family history, past medical history, past social history, past surgical history and problem list.    ROS  Review of Systems   12 point ROS negative except for that listed in the HPI.         Objective:     /73 (BP Location: Left arm, Patient Position: Sitting)   Pulse 68   Ht 157.5 cm (62\")   Wt 76.7 kg (169 lb)   LMP  (LMP Unknown)   SpO2 97%   BMI 30.91 kg/m²      Physical Exam  Constitutional: Patient appears well-developed and well-nourished.   HENT: HEENT exam unremarkable.   Neck: Neck supple. No JVD present. No carotid bruits.   Cardiovascular: Normal rate, regular rhythm and normal heart sounds. No murmur heard.   2+ symmetric pulses.   Pulmonary/Chest: Breath sounds normal. Does not exhibit tenderness.   Abdominal: Abdomen benign.   Musculoskeletal: Does not exhibit edema.   Neurological: Neurological exam unremarkable.   Vitals reviewed.    Data Review:   Lab Results   Component Value Date    GLUCOSE 208 (H) 06/15/2022    BUN 15 06/15/2022    CREATININE 0.89 06/15/2022    BCR 16.9 06/15/2022    K 4.4 06/15/2022    CO2 23.0 06/15/2022    CALCIUM 8.9 06/15/2022    ALBUMIN 4.20 06/15/2022    AST 16 06/15/2022    ALT 18 06/15/2022     Lab Results   Component Value Date    CHOL 181 06/15/2022    TRIG 171 (H) 06/15/2022    HDL 64 (H) 06/15/2022    LDL 88 06/15/2022      Lab Results   Component Value Date    WBC 7.57 06/15/2022    RBC 4.42 06/15/2022    HGB 12.0 06/15/2022    HCT 37.7 06/15/2022    MCV 85.3 06/15/2022     06/15/2022     Lab Results   Component Value Date    HGBA1C 6.80 (H) 06/15/2022        Procedures       Assessment:      Diagnosis   1. Coronary artery disease involving native coronary artery of native heart without angina pectoris    2. Essential hypertension    3. Dyslipidemia      Plan:   Stable cardiac status. No angina or CHF " symptoms.   Deescalate aspirin to 81 mg daily.   Okay to hold plavix 5 days prior to colonoscopy. Continue aspirin.   Continue current medications.   FU in 6 MO, sooner as needed.  Thank you for allowing us to participate in the care of your patient.       Ashley Vega PA-C      Part of this note may be an electronic transcription/translation of spoken language to printed text using the Dragon Dictation System.

## 2023-03-15 ENCOUNTER — TELEPHONE (OUTPATIENT)
Dept: SURGERY | Facility: CLINIC | Age: 63
End: 2023-03-15
Payer: MEDICAID

## 2023-03-20 ENCOUNTER — OUTSIDE FACILITY SERVICE (OUTPATIENT)
Dept: SURGERY | Facility: CLINIC | Age: 63
End: 2023-03-20
Payer: MEDICAID

## 2023-03-20 PROCEDURE — 45378 DIAGNOSTIC COLONOSCOPY: CPT | Performed by: SURGERY

## 2023-03-20 PROCEDURE — 43239 EGD BIOPSY SINGLE/MULTIPLE: CPT | Performed by: SURGERY

## 2023-03-22 ENCOUNTER — TELEPHONE (OUTPATIENT)
Dept: SURGERY | Facility: CLINIC | Age: 63
End: 2023-03-22
Payer: MEDICAID

## 2023-03-22 DIAGNOSIS — D50.8 OTHER IRON DEFICIENCY ANEMIA: Primary | ICD-10-CM

## 2023-04-25 ENCOUNTER — OFFICE VISIT (OUTPATIENT)
Dept: GASTROENTEROLOGY | Facility: CLINIC | Age: 63
End: 2023-04-25
Payer: MEDICAID

## 2023-04-25 ENCOUNTER — LAB (OUTPATIENT)
Dept: LAB | Facility: HOSPITAL | Age: 63
End: 2023-04-25
Payer: MEDICAID

## 2023-04-25 ENCOUNTER — PREP FOR SURGERY (OUTPATIENT)
Dept: OTHER | Facility: HOSPITAL | Age: 63
End: 2023-04-25
Payer: MEDICAID

## 2023-04-25 VITALS
WEIGHT: 170 LBS | SYSTOLIC BLOOD PRESSURE: 147 MMHG | DIASTOLIC BLOOD PRESSURE: 64 MMHG | HEIGHT: 62 IN | BODY MASS INDEX: 31.28 KG/M2

## 2023-04-25 DIAGNOSIS — K31.5 DUODENAL STRICTURE: Primary | ICD-10-CM

## 2023-04-25 DIAGNOSIS — R10.9 TRIGGER POINT OF ABDOMEN: ICD-10-CM

## 2023-04-25 DIAGNOSIS — K31.5 DUODENAL STRICTURE: ICD-10-CM

## 2023-04-25 DIAGNOSIS — D50.9 IRON DEFICIENCY ANEMIA, UNSPECIFIED IRON DEFICIENCY ANEMIA TYPE: Primary | ICD-10-CM

## 2023-04-25 DIAGNOSIS — K57.90 DIVERTICULOSIS: ICD-10-CM

## 2023-04-25 DIAGNOSIS — D50.9 IRON DEFICIENCY ANEMIA, UNSPECIFIED IRON DEFICIENCY ANEMIA TYPE: ICD-10-CM

## 2023-04-25 PROCEDURE — 99244 OFF/OP CNSLTJ NEW/EST MOD 40: CPT | Performed by: INTERNAL MEDICINE

## 2023-04-25 PROCEDURE — 1159F MED LIST DOCD IN RCRD: CPT | Performed by: INTERNAL MEDICINE

## 2023-04-25 PROCEDURE — 3077F SYST BP >= 140 MM HG: CPT | Performed by: INTERNAL MEDICINE

## 2023-04-25 PROCEDURE — 3078F DIAST BP <80 MM HG: CPT | Performed by: INTERNAL MEDICINE

## 2023-04-25 PROCEDURE — 1160F RVW MEDS BY RX/DR IN RCRD: CPT | Performed by: INTERNAL MEDICINE

## 2023-04-25 RX ORDER — LEVOTHYROXINE SODIUM 0.12 MG/1
0.5 TABLET ORAL DAILY
COMMUNITY
Start: 2023-04-14

## 2023-04-25 RX ORDER — ERYTHROMYCIN 5 MG/G
OINTMENT OPHTHALMIC
COMMUNITY
Start: 2023-04-24

## 2023-04-25 RX ORDER — AMOXICILLIN 500 MG/1
CAPSULE ORAL
COMMUNITY
Start: 2023-04-24

## 2023-04-25 RX ORDER — AMITRIPTYLINE HYDROCHLORIDE 25 MG/1
50 TABLET, FILM COATED ORAL NIGHTLY
Qty: 60 TABLET | Refills: 11 | Status: SHIPPED | OUTPATIENT
Start: 2023-04-25

## 2023-04-25 NOTE — PROGRESS NOTES
GASTROENTEROLOGY OFFICE NOTE  Andreea Lopes  [id number]  1960      Chief Complaint   Patient presents with   • Duodenal stricture on outside EGD   • Abdominal Pain        HISTORY OF PRESENT ILLNESS:  Patient referred to me by Dr. Ramírez Wilson for evaluation of a duodenal stricture identified on recent EGD.    First visit to me for this very pleasant 62-year-old white female.  She does have a complaint of abdominal pain.  It is mostly in the right lower abdomen.  This seems to be present at all times, never goes away entirely.  It is unrelated time of day, activities, food intake or bowel habits.  It is a nonradiating pain which is mild to severe but usually just mild.  Again, this has been present for many years.    Intermittently she has an episode of nausea vomiting at night and this occurs maybe once a month at most if at all.  She will have nonbloody emesis.    She does not have any problems with dysphagia solids or dyne aphasia she does not have any early satiety and denies any unexplained weight loss.  She has not noted any melanotic stools nor any bright red blood per rectum.    She underwent EGD and colonoscopy in March 20, 2023 at [facility].  There, duodenal stenosis at the junction of the first and second portion duodenum precluded passage of the endoscope beyond this area where the mucosa was noted to be friable.  Polypoid lesion was biopsied there and in the stomach and these proved to be fundic gland polyps.    Colonoscopy was done, per primary care referral for colon cancer screening however on the review the procedure diagnoses are deficiency anemia and lower abdominal pain was listed as a reason for colonoscopy.  Outside of sigmoid colon diverticulosis the exam was unremarkable and a 10-year screening interval was recommended    Addendum of April 27, 2023:  Labs of April 25, 2023 reviewed.  Iron is 18 mcg/dL.  Ferritin is low at 10.4 ng/mL.  Iron saturation is only 3%.  TIBC is  elevated at 559 mcg/dL.  Hemoglobin is 10.8 with an MCV of 81.1    PAST MEDICAL HISTORY  Past Medical History:   • Anemia   • Anxiety   • Chest pain    Chronic recurrent   • Colon polyp   • Diabetes mellitus    Type 2   • Dyslipidemia    On Statin therapy   • Fatty liver   • GERD (gastroesophageal reflux disease)   • Hyperlipidemia   • Hypertension   • Hypothyroidism    on chronic replacement therapy   • Obesity    BMI 33        PAST SURGICAL HISTORY  Past Surgical History:   • ABDOMINAL SURGERY   • BREAST BIOPSY   • CARDIAC CATHETERIZATION   • CARDIAC CATHETERIZATION    Procedure: Left Heart Cath;  Surgeon: Barber Mcdonnell MD;  Location: [facility] INVASIVE LOCATION;  Service: Cardiology;  Laterality: Left;   • COLONOSCOPY   • CORONARY STENT PLACEMENT   • HYSTERECTOMY   • OOPHORECTOMY   • TUBAL ABDOMINAL LIGATION   • UPPER GASTROINTESTINAL ENDOSCOPY        MEDICATIONS:    Current Outpatient Medications:   •  ALPRAZolam (XANAX) 0.5 MG tablet, Take 1 tablet by mouth 2 (Two) Times a Day As Needed for Anxiety., Disp: , Rfl:   •  amLODIPine (NORVASC) 5 MG tablet, Take 1 tablet by mouth Daily., Disp: 90 tablet, Rfl: 3  •  amoxicillin (AMOXIL) 500 MG capsule, , Disp: , Rfl:   •  aspirin 325 MG tablet, Take 1 tablet by mouth Daily., Disp: , Rfl:   •  cholecalciferol (VITAMIN D3) 1000 UNITS tablet, Take 1 tablet by mouth Daily., Disp: , Rfl:   •  clopidogrel (PLAVIX) 75 MG tablet, Take 1 tablet by mouth Daily., Disp: 90 tablet, Rfl: 3  •  erythromycin (ROMYCIN) 5 MG/GM ophthalmic ointment, , Disp: , Rfl:   •  ibuprofen (ADVIL,MOTRIN) 600 MG tablet, Take 1 tablet by mouth Every 6 (Six) Hours As Needed for Mild Pain., Disp: , Rfl:   •  Januvia 100 MG tablet, Take 1 tablet by mouth Daily., Disp: , Rfl:   •  levothyroxine (SYNTHROID, LEVOTHROID) 125 MCG tablet, Take 0.5 tablets by mouth Daily., Disp: , Rfl:   •  LEVOTHYROXINE SODIUM PO, Take 62 mcg by mouth Daily., Disp: , Rfl:   •  lisinopril (PRINIVIL,ZESTRIL) 40 MG tablet,  "Take 1 tablet by mouth., Disp: , Rfl:   •  MAGNESIUM PO, Take 1 tablet by mouth Every Night., Disp: , Rfl:   •  metFORMIN (GLUCOPHAGE) 500 MG tablet, Take 1 tablet by mouth 2 (Two) Times a Day With Meals., Disp: , Rfl:   •  metoprolol tartrate (LOPRESSOR) 50 MG tablet, Take 1 tablet by mouth Every 12 (Twelve) Hours. Patient doesn't take BID everyday, Disp: , Rfl:   •  nitroglycerin (NITROSTAT) 0.4 MG SL tablet, Place 1 tablet under the tongue Every 5 (Five) Minutes As Needed for Chest Pain. Take no more than 3 doses in 15 minutes., Disp: 25 tablet, Rfl: 3  •  pantoprazole (PROTONIX) 40 MG EC tablet, Take 1 tablet by mouth Daily., Disp: , Rfl:   •  ranolazine (RANEXA) 500 MG 12 hr tablet, TAKE 1 TABLET BY MOUTH 2 TIMES A DAY, Disp: 180 tablet, Rfl: 1  •  rosuvastatin (CRESTOR) 10 MG tablet, Take 0.5 tablets by mouth 3 (Three) Times a Week., Disp: 90 tablet, Rfl: 3  •  amitriptyline (ELAVIL) 25 MG tablet, Take 2 tablets by mouth Every Night., Disp: 60 tablet, Rfl: 11    ALLERGIES  has No Known Allergies.    FAMILY HISTORY:  Cancer-related family history includes Breast cancer (age of onset: 48) in her maternal aunt; Cancer in her father, paternal uncle, and sister; Colon cancer in her sister and sister; Colon cancer (age of onset: 55) in her father.  Colon Cancer-related family history includes Colon cancer in her sister and sister; Colon cancer (age of onset: 55) in her father.    SOCIAL HISTORY  She  reports that she has never smoked. She has never used smokeless tobacco. She reports that she does not drink alcohol and does not use drugs.   Patient is  and has 2 children and 5 grandchildren.  She is retired and is a non-smoker/nondrinker    REVIEW OF SYSTEMS  Cardiovascular, pulmonary and generalized review systems are pertinent as reviewed above    PHYSICAL EXAM   /64 (BP Location: Right arm, Patient Position: Sitting, Cuff Size: Adult)   Ht 157.5 cm (62\")   Wt 77.1 kg (170 lb)   LMP  (LMP Unknown)  "  BMI 31.09 kg/m²   General: Alert and oriented x 3. In no apparent or acute distress.  and No stigmata of chronic liver disease  HEENT: Anicteric sclerae. Normal oropharynx  Neck: Supple. Without lymphadenopathy  CV: Regular rate and rhythm, S1, S2  Lungs: Clear to ausculation. Without rales, rhonchi and wheezing  Abdomen: Abdominal wall trigger points are discernible reproducible with light palpation particularly in the right lower quadrant reproducing her complaint of abdominal pain.  Otherwise abdomen is soft with normal bowel sounds no palpable masses or hepatosplenomegaly  Extremeties: without clubbing, cyanosis or edema  Neurologic:  Alert and oriented x 3 without focal motor or sensory deficits  Rectal exam: deferred        ASSESSMENT  1.-  Abdominal pain is myofascial is clearly evidenced by trigger points that are reproducible.  Trial of amitriptyline/Elavil should help  2.-  Patient does report insomnia.  Hopefully amitriptyline will help with this also.  3.-  Duodenal stricture.  She does have some mild nonspecific dyspeptic symptoms and vomits once or a month or so this may be a manifestation of some degree of gastric outlet obstruction related to her duodenal stricture.  Further evaluation is recommended and I will schedule her therefore at [facility] where we have the XP scope (narrow caliber) available in case this is needed but hopefully we will proceed to dilating the stricture which is most likely a benign stricture related to prior peptic ulcer disease or other inflammatory process  4.-  Iatrogenic coagulopathy.  Patient has already been allowed to discontinue her Plavix per cardiology for recent EGD and colonoscopy but is asked to confer with cardiology before discontinuing her Plavix again  5.-  Coronary artery disease status post coronary stent placement  6.-  Patient up-to-date on her colon cancer screening  7.-  Addendum postvisit: Labs consistent with iron deficiency.   Ferritin of 10.4, TIBC 559 and hemoglobin of 10.8.      PLAN  1.-  Elavil 12.5 to 50 mg p.o. nightly titrated to response and tolerance  2.-  Schedule EGD in approximately 4 weeks at [facility] to allow use, if needed, of the XP scope  3.-  Hold Plavix 5 days prior to procedure  4.-  Further recommendation deferred pending findings of her upper endoscopy and response to Elavil      Sai Monson MD  4/25/2023   18:15 EDT    Addendum of April 27, 2023:  Labs of April 25, 2023 reviewed.  Iron is 18 mcg/dL.  Ferritin is low at 10.4 ng/mL.  Iron saturation is only 3%.  TIBC is elevated at 559 mcg/dL.  Hemoglobin is 10.8 with an MCV of 81.1

## 2023-04-25 NOTE — H&P (VIEW-ONLY)
GASTROENTEROLOGY OFFICE NOTE  Andreea Lopes  [id number]  1960      Chief Complaint   Patient presents with   • Duodenal stricture on outside EGD   • Abdominal Pain        HISTORY OF PRESENT ILLNESS:  Patient referred to me by Dr. Ramírez Wilson for evaluation of a duodenal stricture identified on recent EGD.    First visit to me for this very pleasant 62-year-old white female.  She does have a complaint of abdominal pain.  It is mostly in the right lower abdomen.  This seems to be present at all times, never goes away entirely.  It is unrelated time of day, activities, food intake or bowel habits.  It is a nonradiating pain which is mild to severe but usually just mild.  Again, this has been present for many years.    Intermittently she has an episode of nausea vomiting at night and this occurs maybe once a month at most if at all.  She will have nonbloody emesis.    She does not have any problems with dysphagia solids or dyne aphasia she does not have any early satiety and denies any unexplained weight loss.  She has not noted any melanotic stools nor any bright red blood per rectum.    She underwent EGD and colonoscopy in March 20, 2023 at [facility].  There, duodenal stenosis at the junction of the first and second portion duodenum precluded passage of the endoscope beyond this area where the mucosa was noted to be friable.  Polypoid lesion was biopsied there and in the stomach and these proved to be fundic gland polyps.    Colonoscopy was done, per primary care referral for colon cancer screening however on the review the procedure diagnoses are deficiency anemia and lower abdominal pain was listed as a reason for colonoscopy.  Outside of sigmoid colon diverticulosis the exam was unremarkable and a 10-year screening interval was recommended    Addendum of April 27, 2023:  Labs of April 25, 2023 reviewed.  Iron is 18 mcg/dL.  Ferritin is low at 10.4 ng/mL.  Iron saturation is only 3%.  TIBC is  elevated at 559 mcg/dL.  Hemoglobin is 10.8 with an MCV of 81.1    PAST MEDICAL HISTORY  Past Medical History:   • Anemia   • Anxiety   • Chest pain    Chronic recurrent   • Colon polyp   • Diabetes mellitus    Type 2   • Dyslipidemia    On Statin therapy   • Fatty liver   • GERD (gastroesophageal reflux disease)   • Hyperlipidemia   • Hypertension   • Hypothyroidism    on chronic replacement therapy   • Obesity    BMI 33        PAST SURGICAL HISTORY  Past Surgical History:   • ABDOMINAL SURGERY   • BREAST BIOPSY   • CARDIAC CATHETERIZATION   • CARDIAC CATHETERIZATION    Procedure: Left Heart Cath;  Surgeon: Barber Mcdonnell MD;  Location: [facility] INVASIVE LOCATION;  Service: Cardiology;  Laterality: Left;   • COLONOSCOPY   • CORONARY STENT PLACEMENT   • HYSTERECTOMY   • OOPHORECTOMY   • TUBAL ABDOMINAL LIGATION   • UPPER GASTROINTESTINAL ENDOSCOPY        MEDICATIONS:    Current Outpatient Medications:   •  ALPRAZolam (XANAX) 0.5 MG tablet, Take 1 tablet by mouth 2 (Two) Times a Day As Needed for Anxiety., Disp: , Rfl:   •  amLODIPine (NORVASC) 5 MG tablet, Take 1 tablet by mouth Daily., Disp: 90 tablet, Rfl: 3  •  amoxicillin (AMOXIL) 500 MG capsule, , Disp: , Rfl:   •  aspirin 325 MG tablet, Take 1 tablet by mouth Daily., Disp: , Rfl:   •  cholecalciferol (VITAMIN D3) 1000 UNITS tablet, Take 1 tablet by mouth Daily., Disp: , Rfl:   •  clopidogrel (PLAVIX) 75 MG tablet, Take 1 tablet by mouth Daily., Disp: 90 tablet, Rfl: 3  •  erythromycin (ROMYCIN) 5 MG/GM ophthalmic ointment, , Disp: , Rfl:   •  ibuprofen (ADVIL,MOTRIN) 600 MG tablet, Take 1 tablet by mouth Every 6 (Six) Hours As Needed for Mild Pain., Disp: , Rfl:   •  Januvia 100 MG tablet, Take 1 tablet by mouth Daily., Disp: , Rfl:   •  levothyroxine (SYNTHROID, LEVOTHROID) 125 MCG tablet, Take 0.5 tablets by mouth Daily., Disp: , Rfl:   •  LEVOTHYROXINE SODIUM PO, Take 62 mcg by mouth Daily., Disp: , Rfl:   •  lisinopril (PRINIVIL,ZESTRIL) 40 MG tablet,  "Take 1 tablet by mouth., Disp: , Rfl:   •  MAGNESIUM PO, Take 1 tablet by mouth Every Night., Disp: , Rfl:   •  metFORMIN (GLUCOPHAGE) 500 MG tablet, Take 1 tablet by mouth 2 (Two) Times a Day With Meals., Disp: , Rfl:   •  metoprolol tartrate (LOPRESSOR) 50 MG tablet, Take 1 tablet by mouth Every 12 (Twelve) Hours. Patient doesn't take BID everyday, Disp: , Rfl:   •  nitroglycerin (NITROSTAT) 0.4 MG SL tablet, Place 1 tablet under the tongue Every 5 (Five) Minutes As Needed for Chest Pain. Take no more than 3 doses in 15 minutes., Disp: 25 tablet, Rfl: 3  •  pantoprazole (PROTONIX) 40 MG EC tablet, Take 1 tablet by mouth Daily., Disp: , Rfl:   •  ranolazine (RANEXA) 500 MG 12 hr tablet, TAKE 1 TABLET BY MOUTH 2 TIMES A DAY, Disp: 180 tablet, Rfl: 1  •  rosuvastatin (CRESTOR) 10 MG tablet, Take 0.5 tablets by mouth 3 (Three) Times a Week., Disp: 90 tablet, Rfl: 3  •  amitriptyline (ELAVIL) 25 MG tablet, Take 2 tablets by mouth Every Night., Disp: 60 tablet, Rfl: 11    ALLERGIES  has No Known Allergies.    FAMILY HISTORY:  Cancer-related family history includes Breast cancer (age of onset: 48) in her maternal aunt; Cancer in her father, paternal uncle, and sister; Colon cancer in her sister and sister; Colon cancer (age of onset: 55) in her father.  Colon Cancer-related family history includes Colon cancer in her sister and sister; Colon cancer (age of onset: 55) in her father.    SOCIAL HISTORY  She  reports that she has never smoked. She has never used smokeless tobacco. She reports that she does not drink alcohol and does not use drugs.   Patient is  and has 2 children and 5 grandchildren.  She is retired and is a non-smoker/nondrinker    REVIEW OF SYSTEMS  Cardiovascular, pulmonary and generalized review systems are pertinent as reviewed above    PHYSICAL EXAM   /64 (BP Location: Right arm, Patient Position: Sitting, Cuff Size: Adult)   Ht 157.5 cm (62\")   Wt 77.1 kg (170 lb)   LMP  (LMP Unknown)  "  BMI 31.09 kg/m²   General: Alert and oriented x 3. In no apparent or acute distress.  and No stigmata of chronic liver disease  HEENT: Anicteric sclerae. Normal oropharynx  Neck: Supple. Without lymphadenopathy  CV: Regular rate and rhythm, S1, S2  Lungs: Clear to ausculation. Without rales, rhonchi and wheezing  Abdomen: Abdominal wall trigger points are discernible reproducible with light palpation particularly in the right lower quadrant reproducing her complaint of abdominal pain.  Otherwise abdomen is soft with normal bowel sounds no palpable masses or hepatosplenomegaly  Extremeties: without clubbing, cyanosis or edema  Neurologic:  Alert and oriented x 3 without focal motor or sensory deficits  Rectal exam: deferred        ASSESSMENT  1.-  Abdominal pain is myofascial is clearly evidenced by trigger points that are reproducible.  Trial of amitriptyline/Elavil should help  2.-  Patient does report insomnia.  Hopefully amitriptyline will help with this also.  3.-  Duodenal stricture.  She does have some mild nonspecific dyspeptic symptoms and vomits once or a month or so this may be a manifestation of some degree of gastric outlet obstruction related to her duodenal stricture.  Further evaluation is recommended and I will schedule her therefore at [facility] where we have the XP scope (narrow caliber) available in case this is needed but hopefully we will proceed to dilating the stricture which is most likely a benign stricture related to prior peptic ulcer disease or other inflammatory process  4.-  Iatrogenic coagulopathy.  Patient has already been allowed to discontinue her Plavix per cardiology for recent EGD and colonoscopy but is asked to confer with cardiology before discontinuing her Plavix again  5.-  Coronary artery disease status post coronary stent placement  6.-  Patient up-to-date on her colon cancer screening  7.-  Addendum postvisit: Labs consistent with iron deficiency.   Ferritin of 10.4, TIBC 559 and hemoglobin of 10.8.      PLAN  1.-  Elavil 12.5 to 50 mg p.o. nightly titrated to response and tolerance  2.-  Schedule EGD in approximately 4 weeks at [facility] to allow use, if needed, of the XP scope  3.-  Hold Plavix 5 days prior to procedure  4.-  Further recommendation deferred pending findings of her upper endoscopy and response to Elavil      Sai Monson MD  4/25/2023   18:15 EDT    Addendum of April 27, 2023:  Labs of April 25, 2023 reviewed.  Iron is 18 mcg/dL.  Ferritin is low at 10.4 ng/mL.  Iron saturation is only 3%.  TIBC is elevated at 559 mcg/dL.  Hemoglobin is 10.8 with an MCV of 81.1

## 2023-04-26 LAB
ERYTHROCYTE [DISTWIDTH] IN BLOOD BY AUTOMATED COUNT: 14.4 % (ref 12.3–15.4)
FERRITIN SERPL-MCNC: 10.4 NG/ML (ref 13–150)
HCT VFR BLD AUTO: 33.5 % (ref 34–46.6)
HGB BLD-MCNC: 10.8 G/DL (ref 12–15.9)
IRON SATN MFR SERPL: 3 % (ref 20–50)
IRON SERPL-MCNC: 18 MCG/DL (ref 37–145)
MCH RBC QN AUTO: 26.2 PG (ref 26.6–33)
MCHC RBC AUTO-ENTMCNC: 32.2 G/DL (ref 31.5–35.7)
MCV RBC AUTO: 81.1 FL (ref 79–97)
PLATELET # BLD AUTO: 373 10*3/MM3 (ref 140–450)
RBC # BLD AUTO: 4.13 10*6/MM3 (ref 3.77–5.28)
TIBC SERPL-MCNC: 577 MCG/DL
UIBC SERPL-MCNC: 559 MCG/DL (ref 112–346)
WBC # BLD AUTO: 9.05 10*3/MM3 (ref 3.4–10.8)

## 2023-05-18 ENCOUNTER — PRE-ADMISSION TESTING (OUTPATIENT)
Dept: PREADMISSION TESTING | Facility: HOSPITAL | Age: 63
End: 2023-05-18
Payer: MEDICAID

## 2023-05-18 VITALS — HEIGHT: 62 IN | WEIGHT: 170.19 LBS | BODY MASS INDEX: 31.32 KG/M2

## 2023-05-18 LAB
DEPRECATED RDW RBC AUTO: 47.5 FL (ref 37–54)
ERYTHROCYTE [DISTWIDTH] IN BLOOD BY AUTOMATED COUNT: 15.8 % (ref 12.3–15.4)
HCT VFR BLD AUTO: 32.5 % (ref 34–46.6)
HGB BLD-MCNC: 10 G/DL (ref 12–15.9)
INR PPP: 0.97 (ref 0.89–1.12)
MCH RBC QN AUTO: 25.7 PG (ref 26.6–33)
MCHC RBC AUTO-ENTMCNC: 30.8 G/DL (ref 31.5–35.7)
MCV RBC AUTO: 83.5 FL (ref 79–97)
PLATELET # BLD AUTO: 343 10*3/MM3 (ref 140–450)
PMV BLD AUTO: 9.9 FL (ref 6–12)
POTASSIUM SERPL-SCNC: 4.5 MMOL/L (ref 3.5–5.2)
PROTHROMBIN TIME: 13 SECONDS (ref 12.2–14.5)
RBC # BLD AUTO: 3.89 10*6/MM3 (ref 3.77–5.28)
WBC NRBC COR # BLD: 8.13 10*3/MM3 (ref 3.4–10.8)

## 2023-05-18 PROCEDURE — 84132 ASSAY OF SERUM POTASSIUM: CPT

## 2023-05-18 PROCEDURE — 85027 COMPLETE CBC AUTOMATED: CPT

## 2023-05-18 PROCEDURE — 85610 PROTHROMBIN TIME: CPT

## 2023-05-18 PROCEDURE — 93005 ELECTROCARDIOGRAM TRACING: CPT

## 2023-05-18 PROCEDURE — 36415 COLL VENOUS BLD VENIPUNCTURE: CPT

## 2023-05-18 NOTE — PAT
An arrival time for procedure was not provided during PAT visit. If patient had any questions or concerns about their arrival time, they were instructed to contact their surgeon/physician.  Additionally, if the patient referred to an arrival time that was acquired from their my chart account, patient was encouraged to verify that time with their surgeon/physician. Arrival times are NOT provided in Pre Admission Testing Department.    Patient denies any current skin issues.     LAST NOTE FROM MARCIN MILLIGAN PA-C CARDIOLOGY ON CHART FROM 3/14/23 WITH PLAVIX/ASA INSTRUCTIONS. PT TO F/U IN 6 MOS. PT DENIES CHEST PAIN/SOA.

## 2023-05-20 LAB
QT INTERVAL: 426 MS
QTC INTERVAL: 435 MS

## 2023-05-25 ENCOUNTER — HOSPITAL ENCOUNTER (OUTPATIENT)
Facility: HOSPITAL | Age: 63
Setting detail: HOSPITAL OUTPATIENT SURGERY
Discharge: HOME | End: 2023-05-25
Attending: INTERNAL MEDICINE | Admitting: INTERNAL MEDICINE
Payer: MEDICAID

## 2023-05-25 ENCOUNTER — ANESTHESIA EVENT (OUTPATIENT)
Dept: GASTROENTEROLOGY | Facility: HOSPITAL | Age: 63
End: 2023-05-25
Payer: MEDICAID

## 2023-05-25 ENCOUNTER — ANESTHESIA (OUTPATIENT)
Dept: GASTROENTEROLOGY | Facility: HOSPITAL | Age: 63
End: 2023-05-25
Payer: MEDICAID

## 2023-05-25 VITALS
DIASTOLIC BLOOD PRESSURE: 69 MMHG | OXYGEN SATURATION: 93 % | TEMPERATURE: 97.2 F | HEART RATE: 64 BPM | SYSTOLIC BLOOD PRESSURE: 133 MMHG | RESPIRATION RATE: 18 BRPM

## 2023-05-25 DIAGNOSIS — K31.5 DUODENAL STRICTURE: ICD-10-CM

## 2023-05-25 LAB — GLUCOSE BLDC GLUCOMTR-MCNC: 93 MG/DL (ref 70–130)

## 2023-05-25 PROCEDURE — C1726 CATH, BAL DIL, NON-VASCULAR: HCPCS | Performed by: INTERNAL MEDICINE

## 2023-05-25 PROCEDURE — 88305 TISSUE EXAM BY PATHOLOGIST: CPT | Performed by: INTERNAL MEDICINE

## 2023-05-25 PROCEDURE — 43245 EGD DILATE STRICTURE: CPT | Performed by: INTERNAL MEDICINE

## 2023-05-25 PROCEDURE — 82948 REAGENT STRIP/BLOOD GLUCOSE: CPT

## 2023-05-25 PROCEDURE — 25010000002 PROPOFOL 10 MG/ML EMULSION: Performed by: NURSE ANESTHETIST, CERTIFIED REGISTERED

## 2023-05-25 PROCEDURE — 43239 EGD BIOPSY SINGLE/MULTIPLE: CPT | Performed by: INTERNAL MEDICINE

## 2023-05-25 RX ORDER — IPRATROPIUM BROMIDE AND ALBUTEROL SULFATE 2.5; .5 MG/3ML; MG/3ML
3 SOLUTION RESPIRATORY (INHALATION) ONCE AS NEEDED
Status: DISCONTINUED | OUTPATIENT
Start: 2023-05-25 | End: 2023-05-25

## 2023-05-25 RX ORDER — ONDANSETRON 2 MG/ML
4 INJECTION INTRAMUSCULAR; INTRAVENOUS ONCE AS NEEDED
Status: DISCONTINUED | OUTPATIENT
Start: 2023-05-25 | End: 2023-05-25 | Stop reason: SDUPTHER

## 2023-05-25 RX ORDER — PROPOFOL 10 MG/ML
VIAL (ML) INTRAVENOUS AS NEEDED
Status: DISCONTINUED | OUTPATIENT
Start: 2023-05-25 | End: 2023-05-25 | Stop reason: SURG

## 2023-05-25 RX ORDER — SODIUM CHLORIDE, SODIUM LACTATE, POTASSIUM CHLORIDE, CALCIUM CHLORIDE 600; 310; 30; 20 MG/100ML; MG/100ML; MG/100ML; MG/100ML
INJECTION, SOLUTION INTRAVENOUS CONTINUOUS PRN
Status: DISCONTINUED | OUTPATIENT
Start: 2023-05-25 | End: 2023-05-25 | Stop reason: SURG

## 2023-05-25 RX ORDER — ONDANSETRON 2 MG/ML
4 INJECTION INTRAMUSCULAR; INTRAVENOUS ONCE AS NEEDED
Status: DISCONTINUED | OUTPATIENT
Start: 2023-05-25 | End: 2023-05-25 | Stop reason: HOSPADM

## 2023-05-25 RX ORDER — LIDOCAINE HYDROCHLORIDE 10 MG/ML
INJECTION, SOLUTION EPIDURAL; INFILTRATION; INTRACAUDAL; PERINEURAL AS NEEDED
Status: DISCONTINUED | OUTPATIENT
Start: 2023-05-25 | End: 2023-05-25 | Stop reason: SURG

## 2023-05-25 RX ADMIN — SODIUM CHLORIDE, POTASSIUM CHLORIDE, SODIUM LACTATE AND CALCIUM CHLORIDE: 600; 310; 30; 20 INJECTION, SOLUTION INTRAVENOUS at 14:23

## 2023-05-25 RX ADMIN — LIDOCAINE HYDROCHLORIDE 50 MG: 10 INJECTION, SOLUTION EPIDURAL; INFILTRATION; INTRACAUDAL; PERINEURAL at 14:28

## 2023-05-25 RX ADMIN — PROPOFOL 50 MG: 10 INJECTION, EMULSION INTRAVENOUS at 14:41

## 2023-05-25 RX ADMIN — PROPOFOL 50 MG: 10 INJECTION, EMULSION INTRAVENOUS at 14:38

## 2023-05-25 RX ADMIN — PROPOFOL 50 MG: 10 INJECTION, EMULSION INTRAVENOUS at 14:35

## 2023-05-25 RX ADMIN — PROPOFOL 50 MG: 10 INJECTION, EMULSION INTRAVENOUS at 14:30

## 2023-05-25 RX ADMIN — PROPOFOL 50 MG: 10 INJECTION, EMULSION INTRAVENOUS at 14:32

## 2023-05-25 RX ADMIN — PROPOFOL 50 MG: 10 INJECTION, EMULSION INTRAVENOUS at 14:28

## 2023-05-25 NOTE — ANESTHESIA POSTPROCEDURE EVALUATION
Patient: Andreea Lopes    Procedure Summary     Date: 05/25/23 Room / Location:  [facility] /  [facility]    Anesthesia Start: 1423 Anesthesia Stop: 1452    Procedure: ESOPHAGOGASTRODUODENOSCOPY XP SCOPE Diagnosis:       Duodenal stricture      (Duodenal stricture [K31.5])    Surgeons: Sai Monson MD Provider: Dakota Ruiz MD    Anesthesia Type: general, MAC ASA Status: 3          Anesthesia Type: general, MAC    Vitals  Vitals Value Taken Time   BP 96/50 05/25/23 1452   Temp 97.2 °F (36.2 °C) 05/25/23 1452   Pulse 65 05/25/23 1452   Resp 16 05/25/23 1452   SpO2 94 % 05/25/23 1452           Post Anesthesia Care and Evaluation    Patient location during evaluation: PACU  Patient participation: waiting for patient participation  Level of consciousness: sleepy but conscious    Airway patency: patent  Anesthetic complications: No anesthetic complications  PONV Status: none  Cardiovascular status: stable  Respiratory status: spontaneous ventilation and nasal cannula  Hydration status: acceptable  No anesthesia care post op

## 2023-05-25 NOTE — ANESTHESIA PREPROCEDURE EVALUATION
Anesthesia Evaluation     Patient summary reviewed and Nursing notes reviewed   NPO Solid Status: > 2 hours  NPO Liquid Status: > 8 hours           Airway   Mallampati: II  TM distance: >3 FB  Neck ROM: full  No difficulty expected  Dental      Pulmonary    (-) shortness of breath, recent URI, sleep apnea, not a smoker  Cardiovascular     ECG reviewed    (+) hypertension, CAD (denies ), cardiac stents (2022 single stent ) hyperlipidemia,   (-) past MI, dysrhythmias, angina    ROS comment: ECG NSR   CATH 2022 single-vessel CAD L Cx   nonobstructive disease of the LAD and the RCA ? EF 65%.         Neuro/Psych  (-) seizures, CVA  GI/Hepatic/Renal/Endo    (+) obesity,  GERD,  liver disease fatty liver disease, diabetes mellitus type 2, thyroid problem hypothyroidism  (-) morbid obesity    Musculoskeletal     Abdominal    Substance History      OB/GYN          Other                      Anesthesia Plan    ASA 3     general and MAC     (PFL )  intravenous induction     Anesthetic plan, risks, benefits, and alternatives have been provided, discussed and informed consent has been obtained with: patient.    Plan discussed with CRNA.        CODE STATUS:

## 2023-05-25 NOTE — OP NOTE
EGD summary  See EGD report for details    EGD revealed widely patent asymptomatic ringlike esophageal stricture at EG junction.  Prepyloric hyperplastic appearing polyp which likely will benefit from polypectomy as these can sometimes harbor malignancy.  Biopsies are pending.  There is a duodenal bulb polyp.  This was also biopsied to rule out adenoma.  There was geographical mucosa in the duodenal bulb which was biopsied to rule out celiac disease.  The duodenal stricture was initially intubated with an XP scope and then with a standard scope.  We dilated from 10-12 and then 12 to 15 mm with through-the-scope balloons.    Impression  Status post 15 mm balloon dilation of duodenal stricture likely secondary to prior peptic ulcer disease  Duodenal and gastric polyps which will likely need polypectomy.  Pathology pending    Recommendation  Await pathology report  Repeat EGD in 1 month for further dilation and likely polypectomy

## 2023-05-30 LAB
CYTO UR: NORMAL
LAB AP CASE REPORT: NORMAL
LAB AP CLINICAL INFORMATION: NORMAL
PATH REPORT.FINAL DX SPEC: NORMAL
PATH REPORT.GROSS SPEC: NORMAL

## 2023-06-12 ENCOUNTER — PREP FOR SURGERY (OUTPATIENT)
Dept: GASTROENTEROLOGY | Facility: CLINIC | Age: 63
End: 2023-06-12

## 2023-06-12 DIAGNOSIS — K22.2 ESOPHAGEAL STRICTURE: Primary | ICD-10-CM

## 2023-09-18 ENCOUNTER — TELEPHONE (OUTPATIENT)
Dept: GASTROENTEROLOGY | Facility: CLINIC | Age: 63
End: 2023-09-18

## 2023-09-18 ENCOUNTER — PREP FOR SURGERY (OUTPATIENT)
Dept: OTHER | Facility: HOSPITAL | Age: 63
End: 2023-09-18
Payer: MEDICAID

## 2023-09-18 PROBLEM — K22.2 ESOPHAGEAL STRICTURE: Status: ACTIVE | Noted: 2023-09-18

## 2023-09-18 NOTE — TELEPHONE ENCOUNTER
Caller: PCP OFFICE ON BEHALF OF Andreea Lopes    Relationship to patient: Self    Best call back number: [phone]  CAN CALL ANYTIME     Patient is needing: PATIENT'S PCP OFFICE CALLING TO REQUEST THAT OFFICE CALL PATIENT TO SCHEDULE ESOPHAGEAL STRICTURE.     PCP OFFICE ALSO REQUESTED RELEASE OF INFORMATION, OFFICE WAS TRANSFERRED TO [facility] NUMBER. PCP OFFICE REQUESTED HUB SEND MESSAGE FOR THEM WITHOUT TRANSFERRING TO OFFICE.

## 2023-10-06 ENCOUNTER — TELEPHONE (OUTPATIENT)
Dept: CARDIOLOGY | Facility: CLINIC | Age: 63
End: 2023-10-06
Payer: MEDICAID

## 2023-10-06 NOTE — TELEPHONE ENCOUNTER
Scheduled for an EGD procedure on 112/23 with .Was told to get cardiac clearance prior to procedure. Please advise.

## 2023-10-10 NOTE — TELEPHONE ENCOUNTER
Ok to proceed with procedure from cardiac standpoint. Continue aspirin. Can hold plavix 5 days prior to procedure.

## 2023-11-02 ENCOUNTER — ANESTHESIA EVENT (OUTPATIENT)
Dept: GASTROENTEROLOGY | Facility: HOSPITAL | Age: 63
End: 2023-11-02
Payer: MEDICARE

## 2023-11-02 ENCOUNTER — HOSPITAL ENCOUNTER (OUTPATIENT)
Facility: HOSPITAL | Age: 63
Setting detail: HOSPITAL OUTPATIENT SURGERY
Discharge: HOME | End: 2023-11-02
Attending: INTERNAL MEDICINE | Admitting: INTERNAL MEDICINE
Payer: MEDICARE

## 2023-11-02 ENCOUNTER — ANESTHESIA (OUTPATIENT)
Dept: GASTROENTEROLOGY | Facility: HOSPITAL | Age: 63
End: 2023-11-02
Payer: MEDICARE

## 2023-11-02 ENCOUNTER — TELEPHONE (OUTPATIENT)
Dept: CARDIOLOGY | Facility: CLINIC | Age: 63
End: 2023-11-02
Payer: MEDICARE

## 2023-11-02 VITALS
SYSTOLIC BLOOD PRESSURE: 145 MMHG | DIASTOLIC BLOOD PRESSURE: 75 MMHG | OXYGEN SATURATION: 96 % | TEMPERATURE: 97 F | BODY MASS INDEX: 31.28 KG/M2 | WEIGHT: 170 LBS | RESPIRATION RATE: 16 BRPM | HEIGHT: 62 IN | HEART RATE: 61 BPM

## 2023-11-02 DIAGNOSIS — K22.2 ESOPHAGEAL STRICTURE: ICD-10-CM

## 2023-11-02 LAB
GLUCOSE BLDC GLUCOMTR-MCNC: 137 MG/DL (ref 70–130)
HCT VFR BLD AUTO: 32.7 % (ref 34–46.6)
HGB BLD-MCNC: 9.8 G/DL (ref 12–15.9)
POTASSIUM SERPL-SCNC: 4.6 MMOL/L (ref 3.5–5.2)
QT INTERVAL: 422 MS
QTC INTERVAL: 464 MS

## 2023-11-02 PROCEDURE — 43251 EGD REMOVE LESION SNARE: CPT | Performed by: INTERNAL MEDICINE

## 2023-11-02 PROCEDURE — 93010 ELECTROCARDIOGRAM REPORT: CPT | Performed by: INTERNAL MEDICINE

## 2023-11-02 PROCEDURE — 85014 HEMATOCRIT: CPT | Performed by: ANESTHESIOLOGY

## 2023-11-02 PROCEDURE — 82948 REAGENT STRIP/BLOOD GLUCOSE: CPT

## 2023-11-02 PROCEDURE — 88305 TISSUE EXAM BY PATHOLOGIST: CPT | Performed by: INTERNAL MEDICINE

## 2023-11-02 PROCEDURE — 93005 ELECTROCARDIOGRAM TRACING: CPT | Performed by: INTERNAL MEDICINE

## 2023-11-02 PROCEDURE — 25010000002 ONDANSETRON PER 1 MG: Performed by: NURSE ANESTHETIST, CERTIFIED REGISTERED

## 2023-11-02 PROCEDURE — 85018 HEMOGLOBIN: CPT | Performed by: ANESTHESIOLOGY

## 2023-11-02 PROCEDURE — 84132 ASSAY OF SERUM POTASSIUM: CPT | Performed by: ANESTHESIOLOGY

## 2023-11-02 PROCEDURE — 25010000002 PROPOFOL 10 MG/ML EMULSION: Performed by: NURSE ANESTHETIST, CERTIFIED REGISTERED

## 2023-11-02 PROCEDURE — 25810000003 LACTATED RINGERS PER 1000 ML: Performed by: ANESTHESIOLOGY

## 2023-11-02 PROCEDURE — 43236 UPPR GI SCOPE W/SUBMUC INJ: CPT | Performed by: INTERNAL MEDICINE

## 2023-11-02 DEVICE — DEV CLIP ENDO RESOLUTION360 CONTRL ROT 235CM: Type: IMPLANTABLE DEVICE | Status: FUNCTIONAL

## 2023-11-02 RX ORDER — MORPHINE SULFATE 4 MG/ML
2 INJECTION, SOLUTION INTRAMUSCULAR; INTRAVENOUS
Status: DISCONTINUED | OUTPATIENT
Start: 2023-11-02 | End: 2023-11-02 | Stop reason: HOSPADM

## 2023-11-02 RX ORDER — ONDANSETRON 2 MG/ML
4 INJECTION INTRAMUSCULAR; INTRAVENOUS ONCE AS NEEDED
Status: COMPLETED | OUTPATIENT
Start: 2023-11-02 | End: 2023-11-02

## 2023-11-02 RX ORDER — LIDOCAINE HYDROCHLORIDE 10 MG/ML
INJECTION, SOLUTION EPIDURAL; INFILTRATION; INTRACAUDAL; PERINEURAL AS NEEDED
Status: DISCONTINUED | OUTPATIENT
Start: 2023-11-02 | End: 2023-11-02 | Stop reason: SURG

## 2023-11-02 RX ORDER — PROPOFOL 10 MG/ML
VIAL (ML) INTRAVENOUS AS NEEDED
Status: DISCONTINUED | OUTPATIENT
Start: 2023-11-02 | End: 2023-11-02 | Stop reason: SURG

## 2023-11-02 RX ORDER — SODIUM CHLORIDE, SODIUM LACTATE, POTASSIUM CHLORIDE, CALCIUM CHLORIDE 600; 310; 30; 20 MG/100ML; MG/100ML; MG/100ML; MG/100ML
20 INJECTION, SOLUTION INTRAVENOUS CONTINUOUS
Status: DISCONTINUED | OUTPATIENT
Start: 2023-11-02 | End: 2023-11-02 | Stop reason: HOSPADM

## 2023-11-02 RX ORDER — IPRATROPIUM BROMIDE AND ALBUTEROL SULFATE 2.5; .5 MG/3ML; MG/3ML
3 SOLUTION RESPIRATORY (INHALATION) ONCE AS NEEDED
Status: DISCONTINUED | OUTPATIENT
Start: 2023-11-02 | End: 2023-11-02 | Stop reason: HOSPADM

## 2023-11-02 RX ORDER — NITROGLYCERIN 0.4 MG/1
0.4 TABLET SUBLINGUAL
Status: DISCONTINUED | OUTPATIENT
Start: 2023-11-02 | End: 2023-11-02 | Stop reason: HOSPADM

## 2023-11-02 RX ADMIN — NITROGLYCERIN 0.4 MG: 0.4 TABLET SUBLINGUAL at 14:56

## 2023-11-02 RX ADMIN — PROPOFOL 50 MG: 10 INJECTION, EMULSION INTRAVENOUS at 14:02

## 2023-11-02 RX ADMIN — NITROGLYCERIN 0.4 MG: 0.4 TABLET SUBLINGUAL at 14:39

## 2023-11-02 RX ADMIN — PROPOFOL 50 MG: 10 INJECTION, EMULSION INTRAVENOUS at 13:41

## 2023-11-02 RX ADMIN — PROPOFOL 70 MG: 10 INJECTION, EMULSION INTRAVENOUS at 13:47

## 2023-11-02 RX ADMIN — PROPOFOL 50 MG: 10 INJECTION, EMULSION INTRAVENOUS at 13:52

## 2023-11-02 RX ADMIN — LIDOCAINE HYDROCHLORIDE 100 MG: 10 INJECTION, SOLUTION EPIDURAL; INFILTRATION; INTRACAUDAL; PERINEURAL at 13:37

## 2023-11-02 RX ADMIN — PROPOFOL 80 MG: 10 INJECTION, EMULSION INTRAVENOUS at 13:37

## 2023-11-02 RX ADMIN — PROPOFOL 50 MG: 10 INJECTION, EMULSION INTRAVENOUS at 13:57

## 2023-11-02 RX ADMIN — ONDANSETRON 4 MG: 2 INJECTION INTRAMUSCULAR; INTRAVENOUS at 14:46

## 2023-11-02 RX ADMIN — SODIUM CHLORIDE, POTASSIUM CHLORIDE, SODIUM LACTATE AND CALCIUM CHLORIDE 20 ML/HR: 600; 310; 30; 20 INJECTION, SOLUTION INTRAVENOUS at 11:49

## 2023-11-02 NOTE — NURSING NOTE
"After waking up in recovery pt c/o chest pain. She states she has this recurrently all the time and has for some time. She stated that it \"feels the same as it always does\" She takes nitroglycerin tablets at home and states that's what helps. EKG was obtained and was WNL. Dr Monson and Dr Ruiz notified. Nitroglycerine tablet given per order and Pt's pain was relieved and she stated she felt much better.  Dr Monson wanted pt's cardiology office notified to have patient seen in the next few days instead of next appointment scheduled for April.  Dr Mcdonnell's office was called and message left regarding patient. Before patient was discharged, OMAR Diop from Dr Mcdonnell's office called back.  Was updated about patient and needing appointment.  Chikis left her number for the patient to call and schedule after she was discharged today.  Pt  stated she felt good, No longer had chest pain and VSS were normal prior to d/c.  All information given to patient upon d/c.  "

## 2023-11-02 NOTE — TELEPHONE ENCOUNTER
Caller: [facility]    Best call back number: [phone]    Type of visit: FU    Additional notes: [facility] CALLING TO SCHEDULE A FOLLOW UP APPT FOR THE PATIENT. SAID SHE WAS HAVING CHEST PAIN WHILE ADMITTED AND WOULD LIKE FOR HER TO FOLLOW UP FOR PRECAUTIONARY MEASURES.

## 2023-11-02 NOTE — ANESTHESIA POSTPROCEDURE EVALUATION
Patient: Andreea Lopes    Procedure Summary       Date: 11/02/23 Room / Location:  [facility] /  [facility]    Anesthesia Start: 1334 Anesthesia Stop: 1415    Procedure: ESOPHAGOGASTRODUODENOSCOPY Diagnosis:       Esophageal stricture      (Esophageal stricture [K22.2])    Surgeons: Sai Monson MD Provider: Dakota Ruiz MD    Anesthesia Type: general, MAC ASA Status: 3            Anesthesia Type: general, MAC    Vitals  Vitals Value Taken Time   /76 11/02/23 1411   Temp 97 °F (36.1 °C) 11/02/23 1411   Pulse 81 11/02/23 1413   Resp 16 11/02/23 1411   SpO2 94 % 11/02/23 1413   Vitals shown include unfiled device data.        Post Anesthesia Care and Evaluation    Patient location during evaluation: PACU  Patient participation: complete - patient participated  Level of consciousness: sleepy but conscious  Pain management: adequate    Airway patency: patent  Anesthetic complications: No anesthetic complications  PONV Status: none  Cardiovascular status: hemodynamically stable and acceptable  Respiratory status: nonlabored ventilation, acceptable and nasal cannula  Hydration status: acceptable

## 2023-11-02 NOTE — H&P
GASTROENTEROLOGY OFFICE NOTE  Andreea Lopes  [id number]  1960      CHIEF COMPLAINT  Duodenal gastric polyps    HISTORY OF PRESENT ILLNESS:  63-year-old white female status post EGD 3/25/2023 at which time gastric and duodenal polyps were identified.  Patient presents for polypectomy.  Also history of esophageal stricture dilated to 15 mm on prior endoscopy.    From a GI standpoint, she is asymptomatic.  She is not having any problems with dysphagia to solids or odynophagia.  She denies early satiety or unexplained weight loss.  She has noted no melena.  She notices nothing in the way of any bloating, belching or regurgitation.  She denies melena or bright red blood per rectum.    PAST MEDICAL HISTORY  Past Medical History:    Anemia    Anxiety    Chest pain    Chronic recurrent    Colon polyp    Coronary artery disease    stent x 2    Diabetes mellitus    Type 2    Dyslipidemia    On Statin therapy    Fatty liver    GERD (gastroesophageal reflux disease)    Hyperlipidemia    Hypertension    Hypothyroidism    on chronic replacement therapy    Obesity    BMI 33    Wears eyeglasses        PAST SURGICAL HISTORY  Past Surgical History:    BREAST BIOPSY    CARDIAC CATHETERIZATION    CARDIAC CATHETERIZATION    Procedure: Left Heart Cath;  Surgeon: Barber Mcdonnell MD;  Location:  [facility];  Service: Cardiology;  Laterality: Left;    COLONOSCOPY    CORONARY STENT PLACEMENT    (2) STENTS    ENDOSCOPY    Procedure: ESOPHAGOGASTRODUODENOSCOPY XP SCOPE;  Surgeon: Sai Monson MD;  Location:  [facility];  Service: Gastroenterology;  Laterality: N/A;  balloon dilation done with 12-15  balloon.    HYSTERECTOMY    TOTAL    OOPHORECTOMY    SKIN BIOPSY    TUBAL ABDOMINAL LIGATION    UPPER GASTROINTESTINAL ENDOSCOPY        MEDICATIONS:  Prior to Admission medications    Medication Sig Start Date End Date Taking? Authorizing Provider   ALPRAZolam (XANAX) 0.5 MG tablet Take 1 tablet by mouth  2 (Two) Times a Day As Needed for Anxiety.   Yes Annika Murphy MD   amitriptyline (ELAVIL) 25 MG tablet Take 2 tablets by mouth Every Night.  Patient taking differently: Take 2 tablets by mouth At Night As Needed for Sleep. 4/25/23  Yes Sai Monson MD   amLODIPine (NORVASC) 5 MG tablet Take 1 tablet by mouth Daily. 10/26/21  Yes Barber Mcdonnell MD   aspirin 325 MG tablet Take 1 tablet by mouth Daily.   Yes Annika Murphy MD   cholecalciferol (VITAMIN D3) 1000 UNITS tablet Take 1 tablet by mouth Daily.   Yes Annika Murphy MD   clopidogrel (PLAVIX) 75 MG tablet Take 1 tablet by mouth Daily. 6/15/22  Yes Barber Mcdonnell MD   ibuprofen (ADVIL,MOTRIN) 600 MG tablet Take 1 tablet by mouth Every 6 (Six) Hours As Needed for Mild Pain.   Yes Annika Murphy MD   Januvia 100 MG tablet Take 1 tablet by mouth Daily. 2/15/23  Yes Annika Murphy MD   LEVOTHYROXINE SODIUM PO Take 62 mcg by mouth Daily.   Yes Annika Murphy MD   lisinopril (PRINIVIL,ZESTRIL) 40 MG tablet Take 1 tablet by mouth. 9/28/21  Yes Annika Murphy MD   MAGNESIUM PO Take 400 mg by mouth Every Night.   Yes Annika Murphy MD   metFORMIN (GLUCOPHAGE) 500 MG tablet Take 2 tablets by mouth 2 (Two) Times a Day With Meals.   Yes Annika Murphy MD   metoprolol tartrate (LOPRESSOR) 50 MG tablet Take 1 tablet by mouth Every 12 (Twelve) Hours. Patient doesn't take BID everyday   Yes Annika Murphy MD   nitroglycerin (NITROSTAT) 0.4 MG SL tablet Place 1 tablet under the tongue Every 5 (Five) Minutes As Needed for Chest Pain. Take no more than 3 doses in 15 minutes. 3/14/23  Yes Ashley Vega PA-C   pantoprazole (PROTONIX) 40 MG EC tablet Take 1 tablet by mouth Daily. 12/21/22  Yes Annika Murphy MD   ranolazine (RANEXA) 500 MG 12 hr tablet TAKE 1 TABLET BY MOUTH 2 TIMES A DAY  Patient taking differently: Take 1 tablet by mouth 2 (Two) Times a Day. 1/23/23  Yes Barber Mcdonnell  "MD   rosuvastatin (CRESTOR) 10 MG tablet Take 0.5 tablets by mouth 3 (Three) Times a Week. 6/15/22  Yes Barber Mcdonnell MD      ALLERGIES  has No Known Allergies.    FAMILY HISTORY:  Cancer-related family history includes Breast cancer (age of onset: 48) in her maternal aunt; Cancer in her father, paternal uncle, and sister; Colon cancer in her sister and sister; Colon cancer (age of onset: 55) in her father.  Colon Cancer-related family history includes Colon cancer in her sister and sister; Colon cancer (age of onset: 55) in her father.    SOCIAL HISTORY  She  reports that she has never smoked. She has been exposed to tobacco smoke. She has never used smokeless tobacco. She reports that she does not drink alcohol and does not use drugs.     REVIEW OF SYSTEMS  Cardiovascular, pulmonary and generalized review of system pertinent as reviewed above    PHYSICAL EXAM   /75 (BP Location: Left arm, Patient Position: Lying)   Pulse 65   Temp 98.2 °F (36.8 °C) (Temporal)   Resp 16   Ht 157.5 cm (62\")   Wt 77.1 kg (170 lb)   LMP  (LMP Unknown)   SpO2 99%   BMI 31.09 kg/m²   General: Alert and oriented x 3. In no apparent or acute distress.  and No stigmata of chronic liver disease  HEENT: Anicteric sclerae. Normal oropharynx  Neck: Supple. Without lymphadenopathy  CV: Regular rate and rhythm, S1, S2  Lungs: Clear to ausculation. Without rales, rhonchi and wheezing  Abdomen:  Soft,non-distended without palpable masses or hepatosplenomeagaly, areas of rebound tenderness or guarding.   Extremeties: without clubbing, cyanosis or edema  Neurologic:  Alert and oriented x 3 without focal motor or sensory deficits  Rectal exam: deferred     Results for orders placed or performed during the hospital encounter of 11/02/23   Hemoglobin & Hematocrit, Blood    Specimen: Blood   Result Value Ref Range    Hemoglobin 9.8 (L) 12.0 - 15.9 g/dL    Hematocrit 32.7 (L) 34.0 - 46.6 %        Results Review:  I reviewed the patient's " new clinical results.      ASSESSMENT  1.-Patient presents for EGD for gastric/duodenal polypectomy  2.-Iatrogenic coagulopathy.  She has been off Plavix for 6 days now.  3.-History of duodenal stricture requiring  dilation.  It does not appear to be symptomatic at this time and we do not anticipate further dilation today    PLAN  1.-EGD.  Reassessment of duodenal stricture with dilation as needed  2.-Polypectomy.  3.-Further recommendation deferred pending findings of today's EGD      Sai Monson MD  11/2/2023   12:04 EDT    Addendum  Large peripyloric polyp measuring about 12 to 15 mm removed after submucosal saline injection.  It was removed in its entirety and the polypectomy site was closed with a Endo Clip.  The smaller duodenal bulb polyp was removed its entirety with snare/cautery.  There is some tortuosity in the second  portion of the duodenum consistent with patient's previously known duodenal stenosis.    Recommendation  Await pathology report  Repeat upper endoscopy 1 year for reassessment of gastric polypectomy site

## 2023-11-02 NOTE — ANESTHESIA PREPROCEDURE EVALUATION
Anesthesia Evaluation     Patient summary reviewed and Nursing notes reviewed   NPO Solid Status: > 8 hours  NPO Liquid Status: > 2 hours           Airway   Mallampati: II  TM distance: >3 FB  Neck ROM: full  No difficulty expected  Dental      Pulmonary    (-) asthma, shortness of breath, recent URI, sleep apnea, not a smoker    ROS comment: Sats 97% RA   Cardiovascular     ECG reviewed    (+) hypertension, CAD, cardiac stents more than 12 months ago , hyperlipidemia  (-) angina    ROS comment: ECG NSR   CATH 6/2022 ·Moderate to severe single-vessel CAD involving the L CX  proximal and mid segment stenosis.  This vessel is now status post 3.0 x 12 mm LUDWIN to the proximal circumflex and 2.25 x 18 mm LUDWIN to the mid circumflex reducing both stenosis to 0%.  Mild to moderate nonobstructive disease of the LAD and the RCA ·EF 65%.      Neuro/Psych  (-) seizures, CVA  GI/Hepatic/Renal/Endo    (+) obesity, GERD, liver disease fatty liver disease, diabetes mellitus, thyroid problem   (-) morbid obesity    ROS Comment: Hx Esoph and Duodenal strictures     Musculoskeletal     Abdominal    Substance History      OB/GYN          Other                      Anesthesia Plan    ASA 3     general and MAC     (PFL TIVA)  intravenous induction     Anesthetic plan, risks, benefits, and alternatives have been provided, discussed and informed consent has been obtained with: patient.    Plan discussed with CRNA.      CODE STATUS:

## 2023-11-02 NOTE — TELEPHONE ENCOUNTER
Called patient to investigate symptoms. Patient experienced chest pain after waking up from sedation after endoscopy. EKG done was normal. Received two doses of nitro.     Patient reports having chest pain on and off for about a month. It has become more frequent. She has taken her nitro tablets every night this week to relieve chest pain.     Added her to clinic tomorrow.

## 2023-11-03 ENCOUNTER — OFFICE VISIT (OUTPATIENT)
Dept: CARDIOLOGY | Facility: CLINIC | Age: 63
End: 2023-11-03
Payer: MEDICARE

## 2023-11-03 VITALS
BODY MASS INDEX: 30.77 KG/M2 | HEART RATE: 58 BPM | WEIGHT: 167.2 LBS | SYSTOLIC BLOOD PRESSURE: 118 MMHG | HEIGHT: 62 IN | OXYGEN SATURATION: 98 % | DIASTOLIC BLOOD PRESSURE: 70 MMHG

## 2023-11-03 DIAGNOSIS — I25.110 CORONARY ARTERY DISEASE INVOLVING NATIVE CORONARY ARTERY OF NATIVE HEART WITH UNSTABLE ANGINA PECTORIS: Primary | ICD-10-CM

## 2023-11-03 DIAGNOSIS — Z00.00 HEALTH CARE MAINTENANCE: ICD-10-CM

## 2023-11-03 DIAGNOSIS — I10 ESSENTIAL HYPERTENSION: ICD-10-CM

## 2023-11-03 DIAGNOSIS — E78.5 DYSLIPIDEMIA: ICD-10-CM

## 2023-11-03 RX ORDER — SODIUM CHLORIDE 0.9 % (FLUSH) 0.9 %
10 SYRINGE (ML) INJECTION EVERY 12 HOURS SCHEDULED
Status: CANCELLED | OUTPATIENT
Start: 2023-11-03

## 2023-11-03 RX ORDER — SODIUM CHLORIDE 0.9 % (FLUSH) 0.9 %
10 SYRINGE (ML) INJECTION AS NEEDED
Status: CANCELLED | OUTPATIENT
Start: 2023-11-03

## 2023-11-03 RX ORDER — SODIUM CHLORIDE 9 MG/ML
40 INJECTION, SOLUTION INTRAVENOUS AS NEEDED
Status: CANCELLED | OUTPATIENT
Start: 2023-11-03

## 2023-11-03 RX ORDER — ASPIRIN 81 MG/1
81 TABLET ORAL DAILY
Status: CANCELLED | OUTPATIENT
Start: 2023-11-04

## 2023-11-03 RX ORDER — ASPIRIN 81 MG/1
324 TABLET, CHEWABLE ORAL ONCE
Status: CANCELLED | OUTPATIENT
Start: 2023-11-03 | End: 2023-11-03

## 2023-11-03 NOTE — H&P (VIEW-ONLY)
"[facility] Cardiology    Encounter Date: 2023    Patient ID: Andreea Lopes is a 63 y.o. female.  : 1960     PCP: Candy Uribe APRN       Chief Complaint: Coronary Artery Disease and Chest Pain (currently)      PROBLEM LIST:  Nonobstructive CAD with chronic recurrent CP:  Multiple previous negative MPS.  Positive MPS, 2015: Anterior and anterolateral ischemia. LVEF 74%.  [facility], 2016, Dr. Mcdonnell: Nonobstructive CAD with 30-40% proximal LAD, 30-40% mid LAD, and 50% distal LAD stenosis of small caliber segment of vessel.  Circumflex had 10-20% proximal plaque. LVEF 65%.  Medical management and risk factor modification.  [facility], 06/15/2022; EF 65%. Moderate to severe single-vessel CAD involving the left circumflex coronary artery with proximal and mid segment stenosis.  This vessel is now status post 3.0 x 12 mm LUDWIN to the proximal circumflex and 2.25 x 18 mm LUDWIN to the mid circumflex reducing both stenosis to 0%. Mild to moderate nonobstructive disease of the LAD and the RCA to be managed with medical therapy. Dr. Mcdonnell.   PACs/PVCs:  24h Holter, 2020: SR with 1% PACs and 0.8% PVCs. One short SVT (4 beats)  Hypertension  Dyslipidemia  DM 2  Obesity with BMI 33  Anxiety  Hypothyroidism, on chronic respiratory therapy.  GERD  Surgical history:  Hysterectomy.    History of Present Illness  Patient presents today for a follow-up with a history of coronary artery disease, PACs/PVCs, and cardiac risk factors. Since last visit, patient reports that she has been having intermittent chest pain for the last month.  Her chest pain has been relieved with nitroglycerin.  Over the last 2 weeks it has gotten progressively worse and she is having multiple episodes per day but is trying to only take nitro for the more \"extreme situations.\"  Chest pain can occur at rest or with exertion but she notices it most when she lies down to sleep at night.  She did have an EGD " yesterday and when she woke up from sedation she was complaining of chest pain.  They recommended that she follow-up with our office.    No Known Allergies      Current Outpatient Medications:     ALPRAZolam (XANAX) 0.5 MG tablet, Take 1 tablet by mouth 2 (Two) Times a Day As Needed for Anxiety., Disp: , Rfl:     amitriptyline (ELAVIL) 25 MG tablet, Take 2 tablets by mouth Every Night. (Patient taking differently: Take 2 tablets by mouth At Night As Needed for Sleep.), Disp: 60 tablet, Rfl: 11    amLODIPine (NORVASC) 5 MG tablet, Take 1 tablet by mouth Daily., Disp: 90 tablet, Rfl: 3    aspirin 325 MG tablet, Take 1 tablet by mouth Daily., Disp: , Rfl:     clopidogrel (PLAVIX) 75 MG tablet, Take 1 tablet by mouth Daily., Disp: 90 tablet, Rfl: 3    ibuprofen (ADVIL,MOTRIN) 600 MG tablet, Take 1 tablet by mouth Every 6 (Six) Hours As Needed for Mild Pain., Disp: , Rfl:     Januvia 100 MG tablet, Take 1 tablet by mouth Daily., Disp: , Rfl:     LEVOTHYROXINE SODIUM PO, Take 62 mcg by mouth Daily., Disp: , Rfl:     lisinopril (PRINIVIL,ZESTRIL) 40 MG tablet, Take 1 tablet by mouth Daily., Disp: , Rfl:     MAGNESIUM PO, Take 400 mg by mouth Every Night., Disp: , Rfl:     metFORMIN (GLUCOPHAGE) 500 MG tablet, Take 2 tablets by mouth 2 (Two) Times a Day With Meals., Disp: , Rfl:     metoprolol tartrate (LOPRESSOR) 50 MG tablet, Take 1 tablet by mouth Every 12 (Twelve) Hours. Patient doesn't take BID everyday, Disp: , Rfl:     nitroglycerin (NITROSTAT) 0.4 MG SL tablet, Place 1 tablet under the tongue Every 5 (Five) Minutes As Needed for Chest Pain. Take no more than 3 doses in 15 minutes., Disp: 25 tablet, Rfl: 3    pantoprazole (PROTONIX) 40 MG EC tablet, Take 1 tablet by mouth Daily., Disp: , Rfl:     ranolazine (RANEXA) 500 MG 12 hr tablet, TAKE 1 TABLET BY MOUTH 2 TIMES A DAY (Patient taking differently: Take 1 tablet by mouth 2 (Two) Times a Day.), Disp: 180 tablet, Rfl: 1    rosuvastatin (CRESTOR) 10 MG tablet, Take  "0.5 tablets by mouth 3 (Three) Times a Week., Disp: 90 tablet, Rfl: 3  No current facility-administered medications for this visit.    The following portions of the patient's history were reviewed and updated as appropriate: allergies, current medications, past family history, past medical history, past social history, past surgical history and problem list.    ROS  Review of Systems   14 point ROS negative except for that listed in the HPI.         Objective:     /70 (BP Location: Right arm, Patient Position: Sitting)   Pulse 58   Ht 157.5 cm (62\")   Wt 75.8 kg (167 lb 3.2 oz)   LMP  (LMP Unknown)   SpO2 98%   BMI 30.58 kg/m²      Physical Exam  Constitutional: Patient appears well-developed and well-nourished.   HENT: HEENT exam unremarkable.   Neck: Neck supple. No JVD present. No carotid bruits.   Cardiovascular: Normal rate, regular rhythm and normal heart sounds. No murmur heard.   2+ symmetric pulses.   Pulmonary/Chest: Breath sounds normal. Does not exhibit tenderness.   Abdominal: Abdomen benign.   Musculoskeletal: Does not exhibit edema.   Neurological: Neurological exam unremarkable.   Vitals reviewed.    Data Review:   Lab Results   Component Value Date    WBC 8.13 05/18/2023    RBC 3.89 05/18/2023    HGB 9.8 (L) 11/02/2023    HCT 32.7 (L) 11/02/2023    MCV 83.5 05/18/2023     05/18/2023              ECG 12 Lead    Date/Time: 11/3/2023 10:39 AM  Performed by: Ashley Vega PA-C    Authorized by: Ashley Vega PA-C  Comparison: compared with previous ECG from 11/2/2023  Comparison to previous ECG: Now with sinus bradycardia  Rhythm: sinus bradycardia  Rate: bradycardic  BPM: 58    Clinical impression: normal ECG  Comments: Sinus bradycardia, otherwise normal                      Assessment:      Diagnosis   1. Coronary artery disease involving native coronary artery of native heart with unstable angina pectoris       2. Essential hypertension       3. Dyslipidemia     "     Plan:   Patient with known coronary artery disease now with features of unstable angina.  We have recommended cardiac catheterization for ischemic evaluation.  If she has chest pain not relieved with 3 sublingual nitroglycerin, she should report the emergency department.  Continue current medications.   FU after procedure, sooner as needed.  Thank you for allowing us to participate in the care of your patient.       Ashley Vega PA-C      Please note that portions of this note may have been completed with a voice recognition program. Efforts were made to edit the dictations, but occasionally words are mistranscribed.

## 2023-11-03 NOTE — PROGRESS NOTES
"[facility] Cardiology    Encounter Date: 2023    Patient ID: Andreea Lopes is a 63 y.o. female.  : 1960     PCP: Candy Uribe APRN       Chief Complaint: Coronary Artery Disease and Chest Pain (currently)      PROBLEM LIST:  Nonobstructive CAD with chronic recurrent CP:  Multiple previous negative MPS.  Positive MPS, 2015: Anterior and anterolateral ischemia. LVEF 74%.  [facility], 2016, Dr. Mcdonnell: Nonobstructive CAD with 30-40% proximal LAD, 30-40% mid LAD, and 50% distal LAD stenosis of small caliber segment of vessel.  Circumflex had 10-20% proximal plaque. LVEF 65%.  Medical management and risk factor modification.  [facility], 06/15/2022; EF 65%. Moderate to severe single-vessel CAD involving the left circumflex coronary artery with proximal and mid segment stenosis.  This vessel is now status post 3.0 x 12 mm LUDWIN to the proximal circumflex and 2.25 x 18 mm LUDWIN to the mid circumflex reducing both stenosis to 0%. Mild to moderate nonobstructive disease of the LAD and the RCA to be managed with medical therapy. Dr. Mcdonnell.   PACs/PVCs:  24h Holter, 2020: SR with 1% PACs and 0.8% PVCs. One short SVT (4 beats)  Hypertension  Dyslipidemia  DM 2  Obesity with BMI 33  Anxiety  Hypothyroidism, on chronic respiratory therapy.  GERD  Surgical history:  Hysterectomy.    History of Present Illness  Patient presents today for a follow-up with a history of coronary artery disease, PACs/PVCs, and cardiac risk factors. Since last visit, patient reports that she has been having intermittent chest pain for the last month.  Her chest pain has been relieved with nitroglycerin.  Over the last 2 weeks it has gotten progressively worse and she is having multiple episodes per day but is trying to only take nitro for the more \"extreme situations.\"  Chest pain can occur at rest or with exertion but she notices it most when she lies down to sleep at night.  She did have an EGD " yesterday and when she woke up from sedation she was complaining of chest pain.  They recommended that she follow-up with our office.    No Known Allergies      Current Outpatient Medications:     ALPRAZolam (XANAX) 0.5 MG tablet, Take 1 tablet by mouth 2 (Two) Times a Day As Needed for Anxiety., Disp: , Rfl:     amitriptyline (ELAVIL) 25 MG tablet, Take 2 tablets by mouth Every Night. (Patient taking differently: Take 2 tablets by mouth At Night As Needed for Sleep.), Disp: 60 tablet, Rfl: 11    amLODIPine (NORVASC) 5 MG tablet, Take 1 tablet by mouth Daily., Disp: 90 tablet, Rfl: 3    aspirin 325 MG tablet, Take 1 tablet by mouth Daily., Disp: , Rfl:     clopidogrel (PLAVIX) 75 MG tablet, Take 1 tablet by mouth Daily., Disp: 90 tablet, Rfl: 3    ibuprofen (ADVIL,MOTRIN) 600 MG tablet, Take 1 tablet by mouth Every 6 (Six) Hours As Needed for Mild Pain., Disp: , Rfl:     Januvia 100 MG tablet, Take 1 tablet by mouth Daily., Disp: , Rfl:     LEVOTHYROXINE SODIUM PO, Take 62 mcg by mouth Daily., Disp: , Rfl:     lisinopril (PRINIVIL,ZESTRIL) 40 MG tablet, Take 1 tablet by mouth Daily., Disp: , Rfl:     MAGNESIUM PO, Take 400 mg by mouth Every Night., Disp: , Rfl:     metFORMIN (GLUCOPHAGE) 500 MG tablet, Take 2 tablets by mouth 2 (Two) Times a Day With Meals., Disp: , Rfl:     metoprolol tartrate (LOPRESSOR) 50 MG tablet, Take 1 tablet by mouth Every 12 (Twelve) Hours. Patient doesn't take BID everyday, Disp: , Rfl:     nitroglycerin (NITROSTAT) 0.4 MG SL tablet, Place 1 tablet under the tongue Every 5 (Five) Minutes As Needed for Chest Pain. Take no more than 3 doses in 15 minutes., Disp: 25 tablet, Rfl: 3    pantoprazole (PROTONIX) 40 MG EC tablet, Take 1 tablet by mouth Daily., Disp: , Rfl:     ranolazine (RANEXA) 500 MG 12 hr tablet, TAKE 1 TABLET BY MOUTH 2 TIMES A DAY (Patient taking differently: Take 1 tablet by mouth 2 (Two) Times a Day.), Disp: 180 tablet, Rfl: 1    rosuvastatin (CRESTOR) 10 MG tablet, Take  "0.5 tablets by mouth 3 (Three) Times a Week., Disp: 90 tablet, Rfl: 3  No current facility-administered medications for this visit.    The following portions of the patient's history were reviewed and updated as appropriate: allergies, current medications, past family history, past medical history, past social history, past surgical history and problem list.    ROS  Review of Systems   14 point ROS negative except for that listed in the HPI.         Objective:     /70 (BP Location: Right arm, Patient Position: Sitting)   Pulse 58   Ht 157.5 cm (62\")   Wt 75.8 kg (167 lb 3.2 oz)   LMP  (LMP Unknown)   SpO2 98%   BMI 30.58 kg/m²      Physical Exam  Constitutional: Patient appears well-developed and well-nourished.   HENT: HEENT exam unremarkable.   Neck: Neck supple. No JVD present. No carotid bruits.   Cardiovascular: Normal rate, regular rhythm and normal heart sounds. No murmur heard.   2+ symmetric pulses.   Pulmonary/Chest: Breath sounds normal. Does not exhibit tenderness.   Abdominal: Abdomen benign.   Musculoskeletal: Does not exhibit edema.   Neurological: Neurological exam unremarkable.   Vitals reviewed.    Data Review:   Lab Results   Component Value Date    WBC 8.13 05/18/2023    RBC 3.89 05/18/2023    HGB 9.8 (L) 11/02/2023    HCT 32.7 (L) 11/02/2023    MCV 83.5 05/18/2023     05/18/2023              ECG 12 Lead    Date/Time: 11/3/2023 10:39 AM  Performed by: Ashley Vega PA-C    Authorized by: Ashley Vega PA-C  Comparison: compared with previous ECG from 11/2/2023  Comparison to previous ECG: Now with sinus bradycardia  Rhythm: sinus bradycardia  Rate: bradycardic  BPM: 58    Clinical impression: normal ECG  Comments: Sinus bradycardia, otherwise normal                      Assessment:      Diagnosis   1. Coronary artery disease involving native coronary artery of native heart with unstable angina pectoris       2. Essential hypertension       3. Dyslipidemia     "     Plan:   Patient with known coronary artery disease now with features of unstable angina.  We have recommended cardiac catheterization for ischemic evaluation.  If she has chest pain not relieved with 3 sublingual nitroglycerin, she should report the emergency department.  Continue current medications.   FU after procedure, sooner as needed.  Thank you for allowing us to participate in the care of your patient.       Ashley Vega PA-C      Please note that portions of this note may have been completed with a voice recognition program. Efforts were made to edit the dictations, but occasionally words are mistranscribed.

## 2023-11-06 ENCOUNTER — HOSPITAL ENCOUNTER (OUTPATIENT)
Facility: HOSPITAL | Age: 63
Setting detail: HOSPITAL OUTPATIENT SURGERY
Discharge: HOME | End: 2023-11-06
Attending: INTERNAL MEDICINE | Admitting: INTERNAL MEDICINE
Payer: MEDICARE

## 2023-11-06 VITALS
HEIGHT: 62 IN | BODY MASS INDEX: 31.2 KG/M2 | RESPIRATION RATE: 18 BRPM | OXYGEN SATURATION: 97 % | SYSTOLIC BLOOD PRESSURE: 135 MMHG | DIASTOLIC BLOOD PRESSURE: 59 MMHG | HEART RATE: 73 BPM | WEIGHT: 169.53 LBS | TEMPERATURE: 97.2 F

## 2023-11-06 DIAGNOSIS — I25.110 CORONARY ARTERY DISEASE INVOLVING NATIVE CORONARY ARTERY OF NATIVE HEART WITH UNSTABLE ANGINA PECTORIS: ICD-10-CM

## 2023-11-06 DIAGNOSIS — Z00.00 HEALTH CARE MAINTENANCE: ICD-10-CM

## 2023-11-06 LAB
ALBUMIN SERPL-MCNC: 4.4 G/DL (ref 3.5–5.2)
ALBUMIN/GLOB SERPL: 1.7 G/DL
ALP SERPL-CCNC: 74 U/L (ref 39–117)
ALT SERPL W P-5'-P-CCNC: 14 U/L (ref 1–33)
ANION GAP SERPL CALCULATED.3IONS-SCNC: 9 MMOL/L (ref 5–15)
AST SERPL-CCNC: 15 U/L (ref 1–32)
BASOPHILS # BLD AUTO: 0.03 10*3/MM3 (ref 0–0.2)
BASOPHILS NFR BLD AUTO: 0.4 % (ref 0–1.5)
BILIRUB SERPL-MCNC: 0.2 MG/DL (ref 0–1.2)
BUN BLDA-MCNC: 15 MG/DL (ref 8–26)
BUN SERPL-MCNC: 14 MG/DL (ref 8–23)
BUN/CREAT SERPL: 17.5 (ref 7–25)
CA-I BLDA-SCNC: 1.21 MMOL/L (ref 1.2–1.32)
CALCIUM SPEC-SCNC: 8.9 MG/DL (ref 8.6–10.5)
CHLORIDE BLDA-SCNC: 105 MMOL/L (ref 98–109)
CHLORIDE SERPL-SCNC: 104 MMOL/L (ref 98–107)
CHOLEST SERPL-MCNC: 190 MG/DL (ref 0–200)
CO2 BLDA-SCNC: 24 MMOL/L (ref 24–29)
CO2 SERPL-SCNC: 25 MMOL/L (ref 22–29)
CREAT BLDA-MCNC: 0.8 MG/DL (ref 0.6–1.3)
CREAT SERPL-MCNC: 0.8 MG/DL (ref 0.57–1)
CYTO UR: NORMAL
DEPRECATED RDW RBC AUTO: 49 FL (ref 37–54)
EGFRCR SERPLBLD CKD-EPI 2021: 81.9 ML/MIN/1.73
EGFRCR SERPLBLD CKD-EPI 2021: 81.9 ML/MIN/1.73
EOSINOPHIL # BLD AUTO: 0.18 10*3/MM3 (ref 0–0.4)
EOSINOPHIL NFR BLD AUTO: 2.2 % (ref 0.3–6.2)
ERYTHROCYTE [DISTWIDTH] IN BLOOD BY AUTOMATED COUNT: 17.4 % (ref 12.3–15.4)
GLOBULIN UR ELPH-MCNC: 2.6 GM/DL
GLUCOSE BLDC GLUCOMTR-MCNC: 118 MG/DL (ref 70–130)
GLUCOSE BLDC GLUCOMTR-MCNC: 97 MG/DL (ref 70–130)
GLUCOSE SERPL-MCNC: 120 MG/DL (ref 65–99)
HBA1C MFR BLD: 6.4 % (ref 4.8–5.6)
HCT VFR BLD AUTO: 33.8 % (ref 34–46.6)
HCT VFR BLDA CALC: 35 % (ref 38–51)
HDLC SERPL-MCNC: 61 MG/DL (ref 40–60)
HGB BLD-MCNC: 10.2 G/DL (ref 12–15.9)
HGB BLDA-MCNC: 11.9 G/DL (ref 12–17)
IMM GRANULOCYTES # BLD AUTO: 0.03 10*3/MM3 (ref 0–0.05)
IMM GRANULOCYTES NFR BLD AUTO: 0.4 % (ref 0–0.5)
LAB AP CASE REPORT: NORMAL
LAB AP CLINICAL INFORMATION: NORMAL
LDLC SERPL CALC-MCNC: 97 MG/DL (ref 0–100)
LDLC/HDLC SERPL: 1.5 {RATIO}
LYMPHOCYTES # BLD AUTO: 2.13 10*3/MM3 (ref 0.7–3.1)
LYMPHOCYTES NFR BLD AUTO: 25.8 % (ref 19.6–45.3)
MCH RBC QN AUTO: 23.6 PG (ref 26.6–33)
MCHC RBC AUTO-ENTMCNC: 30.2 G/DL (ref 31.5–35.7)
MCV RBC AUTO: 78.2 FL (ref 79–97)
MONOCYTES # BLD AUTO: 0.55 10*3/MM3 (ref 0.1–0.9)
MONOCYTES NFR BLD AUTO: 6.7 % (ref 5–12)
NEUTROPHILS NFR BLD AUTO: 5.32 10*3/MM3 (ref 1.7–7)
NEUTROPHILS NFR BLD AUTO: 64.5 % (ref 42.7–76)
NRBC BLD AUTO-RTO: 0 /100 WBC (ref 0–0.2)
PATH REPORT.FINAL DX SPEC: NORMAL
PATH REPORT.GROSS SPEC: NORMAL
PLATELET # BLD AUTO: 380 10*3/MM3 (ref 140–450)
PMV BLD AUTO: 9.9 FL (ref 6–12)
POTASSIUM BLDA-SCNC: 4.5 MMOL/L (ref 3.5–4.9)
POTASSIUM SERPL-SCNC: 4.7 MMOL/L (ref 3.5–5.2)
PROT SERPL-MCNC: 7 G/DL (ref 6–8.5)
RBC # BLD AUTO: 4.32 10*6/MM3 (ref 3.77–5.28)
SODIUM BLD-SCNC: 140 MMOL/L (ref 138–146)
SODIUM SERPL-SCNC: 138 MMOL/L (ref 136–145)
TRIGL SERPL-MCNC: 186 MG/DL (ref 0–150)
VLDLC SERPL-MCNC: 32 MG/DL (ref 5–40)
WBC NRBC COR # BLD: 8.24 10*3/MM3 (ref 3.4–10.8)

## 2023-11-06 PROCEDURE — C1894 INTRO/SHEATH, NON-LASER: HCPCS | Performed by: INTERNAL MEDICINE

## 2023-11-06 PROCEDURE — 93458 L HRT ARTERY/VENTRICLE ANGIO: CPT | Performed by: INTERNAL MEDICINE

## 2023-11-06 PROCEDURE — 25010000002 HEPARIN (PORCINE) PER 1000 UNITS: Performed by: INTERNAL MEDICINE

## 2023-11-06 PROCEDURE — 25010000002 MIDAZOLAM PER 1 MG: Performed by: INTERNAL MEDICINE

## 2023-11-06 PROCEDURE — 80061 LIPID PANEL: CPT

## 2023-11-06 PROCEDURE — 85025 COMPLETE CBC W/AUTO DIFF WBC: CPT

## 2023-11-06 PROCEDURE — 25010000002 LIDOCAINE 1 % SOLUTION: Performed by: INTERNAL MEDICINE

## 2023-11-06 PROCEDURE — C1760 CLOSURE DEV, VASC: HCPCS | Performed by: INTERNAL MEDICINE

## 2023-11-06 PROCEDURE — 25810000003 SODIUM CHLORIDE 0.9 % SOLUTION

## 2023-11-06 PROCEDURE — C1769 GUIDE WIRE: HCPCS | Performed by: INTERNAL MEDICINE

## 2023-11-06 PROCEDURE — 25510000001 IOPAMIDOL PER 1 ML: Performed by: INTERNAL MEDICINE

## 2023-11-06 PROCEDURE — 80053 COMPREHEN METABOLIC PANEL: CPT

## 2023-11-06 PROCEDURE — 82948 REAGENT STRIP/BLOOD GLUCOSE: CPT

## 2023-11-06 PROCEDURE — 85014 HEMATOCRIT: CPT

## 2023-11-06 PROCEDURE — 83036 HEMOGLOBIN GLYCOSYLATED A1C: CPT

## 2023-11-06 PROCEDURE — 80047 BASIC METABLC PNL IONIZED CA: CPT

## 2023-11-06 PROCEDURE — 25810000003 SODIUM CHLORIDE 0.9 % SOLUTION: Performed by: INTERNAL MEDICINE

## 2023-11-06 RX ORDER — ISOSORBIDE MONONITRATE 30 MG/1
30 TABLET, EXTENDED RELEASE ORAL DAILY
Qty: 90 TABLET | Refills: 3 | Status: SHIPPED | OUTPATIENT
Start: 2023-11-06

## 2023-11-06 RX ORDER — MIDAZOLAM HYDROCHLORIDE 1 MG/ML
INJECTION INTRAMUSCULAR; INTRAVENOUS
Status: DISCONTINUED | OUTPATIENT
Start: 2023-11-06 | End: 2023-11-06 | Stop reason: HOSPADM

## 2023-11-06 RX ORDER — ROSUVASTATIN CALCIUM 20 MG/1
20 TABLET, COATED ORAL 3 TIMES WEEKLY
Qty: 90 TABLET | Refills: 3 | Status: SHIPPED | OUTPATIENT
Start: 2023-11-06

## 2023-11-06 RX ORDER — LIDOCAINE HYDROCHLORIDE 10 MG/ML
INJECTION, SOLUTION INFILTRATION; PERINEURAL
Status: DISCONTINUED | OUTPATIENT
Start: 2023-11-06 | End: 2023-11-06 | Stop reason: HOSPADM

## 2023-11-06 RX ORDER — NITROGLYCERIN 0.4 MG/1
0.4 TABLET SUBLINGUAL
Status: DISCONTINUED | OUTPATIENT
Start: 2023-11-06 | End: 2023-11-06 | Stop reason: HOSPADM

## 2023-11-06 RX ORDER — NICARDIPINE HCL-0.9% SOD CHLOR 1 MG/10 ML
SYRINGE (ML) INTRAVENOUS
Status: DISCONTINUED | OUTPATIENT
Start: 2023-11-06 | End: 2023-11-06 | Stop reason: HOSPADM

## 2023-11-06 RX ORDER — SODIUM CHLORIDE 0.9 % (FLUSH) 0.9 %
10 SYRINGE (ML) INJECTION EVERY 12 HOURS SCHEDULED
Status: DISCONTINUED | OUTPATIENT
Start: 2023-11-06 | End: 2023-11-06 | Stop reason: HOSPADM

## 2023-11-06 RX ORDER — ACETAMINOPHEN 325 MG/1
650 TABLET ORAL EVERY 4 HOURS PRN
Status: DISCONTINUED | OUTPATIENT
Start: 2023-11-06 | End: 2023-11-06 | Stop reason: HOSPADM

## 2023-11-06 RX ORDER — HEPARIN SODIUM 1000 [USP'U]/ML
INJECTION, SOLUTION INTRAVENOUS; SUBCUTANEOUS
Status: DISCONTINUED | OUTPATIENT
Start: 2023-11-06 | End: 2023-11-06 | Stop reason: HOSPADM

## 2023-11-06 RX ORDER — ASPIRIN 81 MG/1
81 TABLET ORAL DAILY
Status: DISCONTINUED | OUTPATIENT
Start: 2023-11-07 | End: 2023-11-06 | Stop reason: HOSPADM

## 2023-11-06 RX ORDER — ASPIRIN 81 MG/1
324 TABLET, CHEWABLE ORAL ONCE
Status: COMPLETED | OUTPATIENT
Start: 2023-11-06 | End: 2023-11-06

## 2023-11-06 RX ORDER — SODIUM CHLORIDE 9 MG/ML
40 INJECTION, SOLUTION INTRAVENOUS AS NEEDED
Status: DISCONTINUED | OUTPATIENT
Start: 2023-11-06 | End: 2023-11-06 | Stop reason: HOSPADM

## 2023-11-06 RX ORDER — SODIUM CHLORIDE 9 MG/ML
100 INJECTION, SOLUTION INTRAVENOUS CONTINUOUS
Status: ACTIVE | OUTPATIENT
Start: 2023-11-06 | End: 2023-11-06

## 2023-11-06 RX ORDER — SODIUM CHLORIDE 0.9 % (FLUSH) 0.9 %
10 SYRINGE (ML) INJECTION AS NEEDED
Status: DISCONTINUED | OUTPATIENT
Start: 2023-11-06 | End: 2023-11-06 | Stop reason: HOSPADM

## 2023-11-06 RX ADMIN — SODIUM CHLORIDE 227.4 ML: 9 INJECTION, SOLUTION INTRAVENOUS at 08:35

## 2023-11-06 RX ADMIN — ASPIRIN 81 MG CHEWABLE TABLET 324 MG: 81 TABLET CHEWABLE at 08:41

## 2023-11-06 NOTE — Clinical Note
The right DP pulse is +2. The right PT pulse is +2. The right radial pulse is +2. The right femoral pulse is +2.

## 2024-02-13 ENCOUNTER — OFFICE VISIT (OUTPATIENT)
Dept: CARDIOLOGY | Facility: CLINIC | Age: 64
End: 2024-02-13
Payer: MEDICARE

## 2024-02-13 VITALS
SYSTOLIC BLOOD PRESSURE: 134 MMHG | WEIGHT: 169 LBS | HEIGHT: 62 IN | HEART RATE: 97 BPM | BODY MASS INDEX: 31.1 KG/M2 | DIASTOLIC BLOOD PRESSURE: 70 MMHG | OXYGEN SATURATION: 99 %

## 2024-02-13 DIAGNOSIS — I10 ESSENTIAL HYPERTENSION: ICD-10-CM

## 2024-02-13 DIAGNOSIS — I25.10 CORONARY ARTERY DISEASE INVOLVING NATIVE CORONARY ARTERY OF NATIVE HEART WITHOUT ANGINA PECTORIS: Primary | ICD-10-CM

## 2024-02-13 DIAGNOSIS — E78.5 DYSLIPIDEMIA: ICD-10-CM

## 2024-02-13 PROCEDURE — 1160F RVW MEDS BY RX/DR IN RCRD: CPT

## 2024-02-13 PROCEDURE — 99214 OFFICE O/P EST MOD 30 MIN: CPT

## 2024-02-13 PROCEDURE — 3075F SYST BP GE 130 - 139MM HG: CPT

## 2024-02-13 PROCEDURE — 1159F MED LIST DOCD IN RCRD: CPT

## 2024-02-13 PROCEDURE — 3078F DIAST BP <80 MM HG: CPT

## 2024-08-26 NOTE — PROGRESS NOTES
[facility]    Encounter Date: 2024    Patient ID: Andreea Lopes is a 64 y.o. female.  : 1960     PCP: Candy Uribe APRN       Chief Complaint: Coronary Artery Disease (Coronary artery disease involving native coronary artery of native heart without angina pectoris) and Dizziness      PROBLEM LIST:  Nonobstructive CAD with chronic recurrent CP:  Multiple previous negative MPS.  Positive MPS, 2015: Anterior and anterolateral ischemia. LVEF 74%.  [facility], 2016, Dr. Mcdonnell: Nonobstructive CAD with 30-40% proximal LAD, 30-40% mid LAD, and 50% distal LAD stenosis of small caliber segment of vessel.  Circumflex had 10-20% proximal plaque. LVEF 65%.  Medical management and risk factor modification.  [facility], 06/15/2022; EF 65%. Moderate to severe single-vessel CAD involving the left circumflex coronary artery with proximal and mid segment stenosis.  This vessel is now status post 3.0 x 12 mm LUDWIN to the proximal circumflex and 2.25 x 18 mm LUDWIN to the mid circumflex reducing both stenosis to 0%. Mild to moderate nonobstructive disease of the LAD and the RCA to be managed with medical therapy. Dr. Mcdonnell.   [facility], 2023: EF 65%. Nonobstructive noncritical CAD involving multiple vessels.   PACs/PVCs:  24h Holter, 2020: SR with 1% PACs and 0.8% PVCs. One short SVT (4 beats)  Hypertension  Dyslipidemia  DM 2  Obesity with BMI 33  Anxiety  Hypothyroidism, on chronic respiratory therapy.  GERD  Surgical history:  Hysterectomy.    History of Present Illness  Patient presents today for a follow-up with a history of CAD and cardiac risk factors. Since last visit, patient has been doing well from a cardiac standpoint. No ER visits or hospitalizations. She is active in her daily life taking care of her house and her young grandchildren but does not have a regular exercise routine. She did have approximately 3 days of dizziness which she describes as a spinning  sensation. She did check her sugar, HR and BP at that time and all were normal. Patient denies any chest pain, shortness of air, palpitations, orthopnea, edema, presyncope or syncope.      No Known Allergies      Current Outpatient Medications:     ALPRAZolam (XANAX) 0.5 MG tablet, Take 1 tablet by mouth 2 (Two) Times a Day As Needed for Anxiety., Disp: , Rfl:     amitriptyline (ELAVIL) 25 MG tablet, Take 2 tablets by mouth Every Night. (Patient taking differently: Take 2 tablets by mouth At Night As Needed for Sleep.), Disp: 60 tablet, Rfl: 11    amLODIPine (NORVASC) 5 MG tablet, Take 1 tablet by mouth Daily., Disp: 90 tablet, Rfl: 3    aspirin 325 MG tablet, Take 1 tablet by mouth Every Night., Disp: , Rfl:     clopidogrel (PLAVIX) 75 MG tablet, Take 1 tablet by mouth Daily., Disp: 90 tablet, Rfl: 3    ibuprofen (ADVIL,MOTRIN) 600 MG tablet, Take 1 tablet by mouth Every 6 (Six) Hours As Needed for Mild Pain., Disp: , Rfl:     isosorbide mononitrate (IMDUR) 30 MG 24 hr tablet, Take 1 tablet by mouth Daily., Disp: 90 tablet, Rfl: 3    Januvia 100 MG tablet, Take 1 tablet by mouth Daily., Disp: , Rfl:     LEVOTHYROXINE SODIUM PO, Take 62 mcg by mouth Daily., Disp: , Rfl:     lisinopril (PRINIVIL,ZESTRIL) 40 MG tablet, Take 1 tablet by mouth Every Night., Disp: , Rfl:     MAGNESIUM PO, Take 400 mg by mouth Every Night., Disp: , Rfl:     metFORMIN (GLUCOPHAGE) 500 MG tablet, Take 2 tablets by mouth 2 (Two) Times a Day With Meals., Disp: , Rfl:     metoprolol tartrate (LOPRESSOR) 50 MG tablet, Take 1 tablet by mouth Every 12 (Twelve) Hours. Patient doesn't take BID everyday, Disp: , Rfl:     nitroglycerin (NITROSTAT) 0.4 MG SL tablet, Place 1 tablet under the tongue Every 5 (Five) Minutes As Needed for Chest Pain. Take no more than 3 doses in 15 minutes., Disp: 25 tablet, Rfl: 3    pantoprazole (PROTONIX) 40 MG EC tablet, Take 1 tablet by mouth Daily., Disp: , Rfl:     ranolazine (RANEXA) 500 MG 12 hr tablet, TAKE 1  "TABLET BY MOUTH 2 TIMES A DAY (Patient taking differently: Take 1 tablet by mouth 2 (Two) Times a Day.), Disp: 180 tablet, Rfl: 1    rosuvastatin (CRESTOR) 20 MG tablet, Take 1 tablet by mouth 3 (Three) Times a Week., Disp: 90 tablet, Rfl: 3    The following portions of the patient's history were reviewed and updated as appropriate: allergies, current medications, past family history, past medical history, past social history, past surgical history and problem list.    ROS  Review of Systems   14 point ROS negative except for that listed in the HPI.         Objective:     /62 (BP Location: Left arm, Patient Position: Sitting)   Pulse 65   Ht 157.5 cm (62\")   Wt 77.7 kg (171 lb 6.4 oz)   LMP  (LMP Unknown)   SpO2 97%   BMI 31.35 kg/m²      Physical Exam  Constitutional: Patient appears well-developed and well-nourished.   HENT: HEENT exam unremarkable.   Neck: Neck supple. No JVD present.   Cardiovascular: Normal rate, regular rhythm and normal heart sounds. No murmur heard.   2+ symmetric pulses.   Pulmonary/Chest: Breath sounds normal. Does not exhibit tenderness.   Musculoskeletal: Does not exhibit edema.   Neurological: Neurological exam unremarkable.   Vitals reviewed.    Data Review:   Lab Results   Component Value Date    GLUCOSE 120 (H) 11/06/2023    BUN 14 11/06/2023    CREATININE 0.80 11/06/2023    EGFR 81.9 11/06/2023    BCR 17.5 11/06/2023     11/06/2023    K 4.7 11/06/2023    CO2 25.0 11/06/2023    CALCIUM 8.9 11/06/2023    ALBUMIN 4.4 11/06/2023    AST 15 11/06/2023    ALT 14 11/06/2023     Lab Results   Component Value Date    CHOL 190 11/06/2023    TRIG 186 (H) 11/06/2023    HDL 61 (H) 11/06/2023    LDL 97 11/06/2023      Lab Results   Component Value Date    WBC 8.24 11/06/2023    RBC 4.32 11/06/2023    HGB 11.9 (L) 11/06/2023    HCT 35 (L) 11/06/2023    MCV 78.2 (L) 11/06/2023     11/06/2023     Lab Results   Component Value Date    HGBA1C 6.40 (H) 11/06/2023      "   Procedures       Advance Care Planning   ACP discussion was held with the patient during this visit. Patient does not have an advance directive, declines further assistance.           Assessment and plan:      Diagnosis Plan   1. Coronary artery disease involving native coronary artery of native heart without angina pectoris  Stable without current angina. Continue aspirin, plavix and and crestor 20mg daily. Continue imdur and ranexa for antinaginal therapy.      2. Essential hypertension  Well controlled. Continue current antihypertensive therapy.      3. Dyslipidemia  Continue crestor 20mg daily. Repeat FLP at follow up. Discussed the importance of heart healthy diet and exercise. May need to uptitrate crestor to achieve goal LDL < 70.        Stable cardiac status.   Continue current medications.   FU in 12 MO, sooner as needed.  Thank you for allowing us to participate in the care of your patient.       Ashley Vega PA-C      Please note that portions of this note may have been completed with a voice recognition program. Efforts were made to edit the dictations, but occasionally words are mistranscribed.

## 2024-08-27 ENCOUNTER — OFFICE VISIT (OUTPATIENT)
Dept: CARDIOLOGY | Facility: CLINIC | Age: 64
End: 2024-08-27
Payer: MEDICARE

## 2024-08-27 VITALS
SYSTOLIC BLOOD PRESSURE: 116 MMHG | WEIGHT: 171.4 LBS | OXYGEN SATURATION: 97 % | DIASTOLIC BLOOD PRESSURE: 62 MMHG | HEART RATE: 65 BPM | BODY MASS INDEX: 31.54 KG/M2 | HEIGHT: 62 IN

## 2024-08-27 DIAGNOSIS — I25.10 CORONARY ARTERY DISEASE INVOLVING NATIVE CORONARY ARTERY OF NATIVE HEART WITHOUT ANGINA PECTORIS: Primary | ICD-10-CM

## 2024-08-27 DIAGNOSIS — E78.5 DYSLIPIDEMIA: ICD-10-CM

## 2024-08-27 DIAGNOSIS — I10 ESSENTIAL HYPERTENSION: ICD-10-CM

## 2024-08-27 RX ORDER — RANOLAZINE 500 MG/1
500 TABLET, EXTENDED RELEASE ORAL 2 TIMES DAILY
Qty: 180 TABLET | Refills: 1 | Status: SHIPPED | OUTPATIENT
Start: 2024-08-27

## 2024-08-27 RX ORDER — AMLODIPINE BESYLATE 5 MG/1
5 TABLET ORAL DAILY
Qty: 90 TABLET | Refills: 3 | Status: SHIPPED | OUTPATIENT
Start: 2024-08-27

## 2024-10-04 ENCOUNTER — TELEPHONE (OUTPATIENT)
Dept: GASTROENTEROLOGY | Facility: CLINIC | Age: 64
End: 2024-10-04
Payer: MEDICARE

## 2025-02-21 RX ORDER — ISOSORBIDE MONONITRATE 30 MG/1
30 TABLET, EXTENDED RELEASE ORAL DAILY
Qty: 90 TABLET | Refills: 3 | Status: SHIPPED | OUTPATIENT
Start: 2025-02-21

## 2025-07-29 ENCOUNTER — OFFICE VISIT (OUTPATIENT)
Dept: CARDIOLOGY | Facility: CLINIC | Age: 65
End: 2025-07-29
Payer: MEDICARE

## 2025-07-29 VITALS
HEIGHT: 62 IN | BODY MASS INDEX: 26.24 KG/M2 | OXYGEN SATURATION: 100 % | HEART RATE: 64 BPM | SYSTOLIC BLOOD PRESSURE: 125 MMHG | DIASTOLIC BLOOD PRESSURE: 59 MMHG | WEIGHT: 142.6 LBS

## 2025-07-29 DIAGNOSIS — I10 ESSENTIAL HYPERTENSION: ICD-10-CM

## 2025-07-29 DIAGNOSIS — I25.10 CORONARY ARTERY DISEASE INVOLVING NATIVE CORONARY ARTERY OF NATIVE HEART WITHOUT ANGINA PECTORIS: Primary | ICD-10-CM

## 2025-07-29 DIAGNOSIS — E78.5 DYSLIPIDEMIA: ICD-10-CM

## 2025-07-29 PROCEDURE — 3078F DIAST BP <80 MM HG: CPT | Performed by: INTERNAL MEDICINE

## 2025-07-29 PROCEDURE — 1160F RVW MEDS BY RX/DR IN RCRD: CPT | Performed by: INTERNAL MEDICINE

## 2025-07-29 PROCEDURE — 99214 OFFICE O/P EST MOD 30 MIN: CPT | Performed by: INTERNAL MEDICINE

## 2025-07-29 PROCEDURE — 1159F MED LIST DOCD IN RCRD: CPT | Performed by: INTERNAL MEDICINE

## 2025-07-29 PROCEDURE — 3074F SYST BP LT 130 MM HG: CPT | Performed by: INTERNAL MEDICINE

## 2025-07-29 RX ORDER — ROSUVASTATIN CALCIUM 5 MG/1
1 TABLET, COATED ORAL
COMMUNITY
Start: 2025-07-21

## 2025-07-29 RX ORDER — ASPIRIN 81 MG/1
81 TABLET ORAL DAILY
COMMUNITY

## 2025-07-29 RX ORDER — ISOSORBIDE MONONITRATE 60 MG/1
60 TABLET, EXTENDED RELEASE ORAL DAILY
Qty: 90 TABLET | Refills: 1 | Status: SHIPPED | OUTPATIENT
Start: 2025-07-29

## 2025-07-29 RX ORDER — LEVOTHYROXINE SODIUM 125 UG/1
TABLET ORAL
COMMUNITY
Start: 2025-07-23

## 2025-07-29 NOTE — PROGRESS NOTES
[facility]    Encounter Date: 2025    Patient ID: Andreea Lopes is a 65 y.o. female.  : 1960     PCP: Candy Uribe APRN       Chief Complaint: Coronary Artery Disease      PROBLEM LIST:  CAD with chronic recurrent CP:  Multiple previous negative MPS.  Positive MPS, 2015: Anterior and anterolateral ischemia. LVEF 74%.  [facility], 2016, Dr. Mcdonnell: Nonobstructive CAD with 30-40% proximal LAD, 30-40% mid LAD, and 50% distal LAD stenosis of small caliber segment of vessel.  Circumflex had 10-20% proximal plaque. LVEF 65%.  Medical management and risk factor modification.  [facility], 06/15/2022; EF 65%. Moderate to severe single-vessel CAD involving the left circumflex coronary artery with proximal and mid segment stenosis.  This vessel is now status post 3.0 x 12 mm LUDWIN to the proximal circumflex and 2.25 x 18 mm LUDWIN to the mid circumflex reducing both stenosis to 0%. Mild to moderate nonobstructive disease of the LAD and the RCA to be managed with medical therapy. Dr. Mcdonnell.   [facility], 2023: EF 65%. Nonobstructive noncritical CAD involving multiple vessels.   PACs/PVCs:  24h Holter, 2020: SR with 1% PACs and 0.8% PVCs. One short SVT (4 beats)  Hypertension  Dyslipidemia  DM 2  Obesity with BMI 33  Anxiety  Hypothyroidism, on chronic respiratory therapy.  GERD  Surgical history:  Hysterectomy.    History of Present Illness  Patient presents today for a follow-up with a history of CAD and cardiac risk factors. Since last visit, patient states that she has had two episodes of chest pain. She presented to the ED both times. She had blood work and testing performed. Patient states that she took nitroglycerin both times with relief. The first time she took a Mounjaro injection prior to the pain, and the second time she was cleaning the bathroom. Patient denies shortness of breath, orthopnea, palpitations, edema, dizziness, and syncope.  She went to see  the PCP today for follow-up and was sent to us for further cardiac evaluation.  She brought an EKG in hand which was reviewed and is normal.      No Known Allergies      Current Outpatient Medications:     ALPRAZolam (XANAX) 0.5 MG tablet, Take 1 tablet by mouth 2 (Two) Times a Day As Needed for Anxiety., Disp: , Rfl:     amitriptyline (ELAVIL) 25 MG tablet, Take 2 tablets by mouth Every Night. (Patient taking differently: Take 2 tablets by mouth At Night As Needed for Sleep.), Disp: 60 tablet, Rfl: 11    amLODIPine (NORVASC) 5 MG tablet, Take 1 tablet by mouth Daily., Disp: 90 tablet, Rfl: 3    aspirin 81 MG EC tablet, Take 1 tablet by mouth Daily., Disp: , Rfl:     clopidogrel (PLAVIX) 75 MG tablet, Take 1 tablet by mouth Daily., Disp: 90 tablet, Rfl: 3    ibuprofen (ADVIL,MOTRIN) 600 MG tablet, Take 1 tablet by mouth Every 6 (Six) Hours As Needed for Mild Pain., Disp: , Rfl:     isosorbide mononitrate (IMDUR) 60 MG 24 hr tablet, Take 1 tablet by mouth Daily., Disp: 90 tablet, Rfl: 1    Januvia 100 MG tablet, Take 1 tablet by mouth Daily., Disp: , Rfl:     levothyroxine (SYNTHROID, LEVOTHROID) 125 MCG tablet, TAKE ONE-HALF TAB EVERY MORNING, Disp: , Rfl:     lisinopril (PRINIVIL,ZESTRIL) 40 MG tablet, Take 1 tablet by mouth Every Night., Disp: , Rfl:     MAGNESIUM PO, Take 400 mg by mouth Every Night., Disp: , Rfl:     metFORMIN (GLUCOPHAGE) 500 MG tablet, Take 1 tablet by mouth 2 (Two) Times a Day With Meals., Disp: , Rfl:     metoprolol tartrate (LOPRESSOR) 50 MG tablet, Take 1 tablet by mouth Every 12 (Twelve) Hours. Patient doesn't take BID everyday, Disp: , Rfl:     nitroglycerin (NITROSTAT) 0.4 MG SL tablet, Place 1 tablet under the tongue Every 5 (Five) Minutes As Needed for Chest Pain. Take no more than 3 doses in 15 minutes., Disp: 25 tablet, Rfl: 3    pantoprazole (PROTONIX) 40 MG EC tablet, Take 1 tablet by mouth Daily., Disp: , Rfl:     ranolazine (RANEXA) 500 MG 12 hr tablet, Take 1 tablet by mouth 2  "(Two) Times a Day., Disp: 180 tablet, Rfl: 1    rosuvastatin (CRESTOR) 5 MG tablet, Take 1 tablet by mouth Every Other Day., Disp: , Rfl:     aspirin 325 MG tablet, Take 1 tablet by mouth Every Night. (Patient not taking: Reported on 7/29/2025), Disp: , Rfl:     The following portions of the patient's history were reviewed and updated as appropriate: allergies, current medications, past family history, past medical history, past social history, past surgical history and problem list.    ROS  Review of Systems   14 point ROS negative except for that listed in the HPI.         Objective:     /59 (BP Location: Left arm, Patient Position: Sitting)   Pulse 64   Ht 157.5 cm (62\")   Wt 64.7 kg (142 lb 9.6 oz)   LMP  (LMP Unknown)   SpO2 100%   BMI 26.08 kg/m²      Physical Exam  Constitutional: Patient appears well-developed and well-nourished.   HENT: HEENT exam unremarkable.   Neck: Neck supple. No JVD present. No carotid bruits.   Cardiovascular: Normal rate, regular rhythm and normal heart sounds. No murmur heard.   2+ symmetric pulses.   Pulmonary/Chest: Breath sounds normal. Does not exhibit tenderness.   Abdominal: Abdomen benign.   Musculoskeletal: Does not exhibit edema.   Neurological: Neurological exam unremarkable.   Vitals reviewed.    Data Review:     Lab date: 09/11/2024  FLP: , , HDL 87,   CMP: Glu 146, BUN 15, Creat 0.90, eGFR 63, Na 140, K 5.1, Cl 106, CO2 22, Ca 9.1, Alk Phos 73, AST 38, ALT 42  CBC: WBC 7.8, RBC 4.24, HGB 10.1, HCT 31.7, MCV 74.7, MCH 23.8,   HbA1c: 9.1     Procedures     Advance Care Planning   ACP discussion was held with the patient during this visit. Patient does not have an advance directive, declines further assistance.           Assessment:      Diagnosis Plan   1. Coronary artery disease involving native coronary artery of native heart with stable angina pectoris  Patient has presented to the ED twice with chest pain. Increase Imdur from 30 " mg to 60 mg daily for chest pain.  He is on multiple antianginals which also include metoprolol, amlodipine and Ranexa.  If chest pain continues, we will consider a heart catheterization. Continue aspirin 81 mg and Plavix 75 mg daily for DAPT. Continue on Ranexa 500 mg BID and nitroglycerin 0.4 mg as needed for angina.      2. Essential hypertension  Well controlled. Continue on amlodipine 5 mg daily, metoprolol 50 mg BID, and lisinopril 40 mg nightly for hypertension.       3. Dyslipidemia   on 09/11/2024. Continue on rosuvastatin 5 mg every other day for hyperlipidemia.        Plan:   Stable cardiac status.   Increase Imdur from 30 mg to 60 mg daily for chest pain.   If chest pain continues, patient advised to contact us and we will consider a heart catheterization evaluation as she is already on 4 antianginals.   Continue rest of current medications.   FU in 6 weeks, sooner as needed.  Thank you for allowing us to participate in the care of your patient.     Scribed for Barber Mcdonnell MD by Neena Carreno. 7/29/2025 13:16 EDT    I, Barber Mcdonnell MD, personally performed the services described in this documentation as scribed by the above named individual in my presence, and it is both accurate and complete.  7/29/2025  13:21 EDT      Please note that portions of this note may have been completed with a voice recognition program. Efforts were made to edit the dictations, but occasionally words are mistranscribed.

## 2025-08-04 ENCOUNTER — TELEPHONE (OUTPATIENT)
Dept: GASTROENTEROLOGY | Facility: CLINIC | Age: 65
End: 2025-08-04
Payer: MEDICARE

## (undated) DEVICE — MODEL BT2000 P/N 700287-012KIT CONTENTS: MANIFOLD WITH SALINE AND CONTRAST PORTS, SALINE TUBING WITH SPIKE AND HAND SYRINGE, TRANSDUCER: Brand: BT2000 AUTOMATED MANIFOLD KIT

## (undated) DEVICE — "MH-443 SUCTION VALVE F/EVIS140 EVIS160": Brand: SUCTION VALVE

## (undated) DEVICE — CATH DIAG EXPO .045 FL3  5F 100CM

## (undated) DEVICE — GW LUGE .014 182 CM

## (undated) DEVICE — LUBE JELLY FOIL PACK 1.4 OZ: Brand: MEDLINE INDUSTRIES, INC.

## (undated) DEVICE — MODEL AT P65, P/N 701554-001KIT CONTENTS: HAND CONTROLLER, 3-WAY HIGH-PRESSURE STOPCOCK WITH ROTATING END AND PREMIUM HIGH-PRESSURE TUBING: Brand: ANGIOTOUCH® KIT

## (undated) DEVICE — INTRO SHEATH PRELUDE IDEAL SPRNG COIL 021 6F 23X80CM

## (undated) DEVICE — GW INQWIRE FC PTFE STD J/1.5 .035 260

## (undated) DEVICE — FIRST STEP BEDSIDE ADD WATER KIT - RESEALABLE STAND-UP POUCH, ENDOSCOPIC CLEANING PAD - 1 POUCH: Brand: FIRST STEP BEDSIDE ADD WATER KIT - RESEALABLE STAND-UP POUCH, ENDOSCOPIC CLEANIN

## (undated) DEVICE — ADAPT CLN LUM OLYMP AIR/H20

## (undated) DEVICE — THE CARR-LOCKE INJECTION NEEDLE IS A SINGLE USE, DISPOSABLE, FLEXIBLE SHEATH INJECTION NEEDLE USED FOR THE INJECTION OF VARIOUS TYPES OF MEDIA THROUGH FLEXIBLE ENDOSCOPES.

## (undated) DEVICE — DEV COMP RAD PRELUDESYNC 29CM

## (undated) DEVICE — KT ORCA ORCAPOD DISP STRL

## (undated) DEVICE — INTRO ACCSR BLNT TP

## (undated) DEVICE — Device: Brand: DEFENDO AIR/WATER/SUCTION AND BIOPSY VALVE

## (undated) DEVICE — ADULT, W/LG. BACK PAD, RADIOTRANSPARENT ELEMENT AND LEAD WIRE COMPATIBLE W/: Brand: DEFIBRILLATION ELECTRODES

## (undated) DEVICE — ESOPHAGEAL/PYLORIC/COLONIC WIREGUIDED BALLOON DILATATION CATHETER: Brand: CRE WIREGUIDED

## (undated) DEVICE — ERBE NESSY®PLATE 170 SPLIT; 168CM²; CABLE 3M: Brand: ERBE

## (undated) DEVICE — CATH DIAG EXPO M/ PK 5F FL4/FR4 PIG

## (undated) DEVICE — THE BITE BLOCK MAXI, LATEX FREE STRAP IS USED TO PROTECT THE ENDOSCOPE INSERTION TUBE FROM BEING BITTEN BY THE PATIENT.

## (undated) DEVICE — TUBING, SUCTION, 1/4" X 10', STRAIGHT: Brand: MEDLINE

## (undated) DEVICE — CONTN GRAD MEAS TRIANG 32OZ BLK

## (undated) DEVICE — SYR LUERLOK 50ML

## (undated) DEVICE — ST EXT IV SMRTSTE 2VLV FIX M LL 6ML 41

## (undated) DEVICE — SAFELINER SUCTION CANISTER 1000CC: Brand: DEROYAL

## (undated) DEVICE — ENDOGATOR HYBRID TUBING KIT FOR USE WITH ENDOGATOR IRRIGATION PUMP, OLYMPUS PUMP, GI4000 ESU, AND TORRENT IRRIGATION PUMP.: Brand: ENDOGATOR KIT

## (undated) DEVICE — MINI TREK CORONARY DILATATION CATHETER 2.0 MM X 12 MM / RAPID-EXCHANGE: Brand: MINI TREK

## (undated) DEVICE — PK CATH CARD 10

## (undated) DEVICE — THE SINGLE USE ETRAP – POLYP TRAP IS USED FOR SUCTION RETRIEVAL OF ENDOSCOPICALLY REMOVED POLYPS.: Brand: ETRAP

## (undated) DEVICE — SOLIDIFIER LIQ PREMISORB 1500CC

## (undated) DEVICE — GW PERIPH GUIDERIGHT STD/EXCHNG/J/TIP SS 0.035IN 5X260CM

## (undated) DEVICE — ST LINER SAFECAP GRN RED CP STRL

## (undated) DEVICE — SOL IRR H2O BTL 1000ML STRL

## (undated) DEVICE — DEV INFL MONARCH 25W

## (undated) DEVICE — NDL PERC 1PRT THNWALL W/BASEPLT 18G 7CM

## (undated) DEVICE — HI-TORQUE WHISPER MS GUIDE WIRE .014 STRAIGHT TIP 3.0 CM X 190 CM: Brand: HI-TORQUE WHISPER

## (undated) DEVICE — PINNACLE INTRODUCER SHEATH: Brand: PINNACLE

## (undated) DEVICE — SNAR POLYP CAPTIVATOR HEX 27MM 240CM

## (undated) DEVICE — HYBRID CO2 TUBING/CAP SET FOR OLYMPUS® SCOPES & CO2 SOURCE: Brand: ERBE

## (undated) DEVICE — ANGIO-SEAL VIP VASCULAR CLOSURE DEVICE: Brand: ANGIO-SEAL

## (undated) DEVICE — DEV COMP RAD PRELUDESYNC 24CM

## (undated) DEVICE — GUIDE CATHETER: Brand: MACH1™

## (undated) DEVICE — DEV INFL CRE STERIFLATE 60CC DISP